# Patient Record
Sex: FEMALE | Race: WHITE | NOT HISPANIC OR LATINO | Employment: FULL TIME | URBAN - METROPOLITAN AREA
[De-identification: names, ages, dates, MRNs, and addresses within clinical notes are randomized per-mention and may not be internally consistent; named-entity substitution may affect disease eponyms.]

---

## 2021-07-15 ENCOUNTER — HOSPITAL ENCOUNTER (OUTPATIENT)
Dept: GENERAL RADIOLOGY | Facility: HOSPITAL | Age: 61
Discharge: HOME | End: 2021-07-15

## 2021-07-15 ENCOUNTER — LAB (OUTPATIENT)
Dept: LAB | Facility: HOSPITAL | Age: 61
End: 2021-07-15

## 2021-07-15 ENCOUNTER — HOSPITAL ENCOUNTER (OUTPATIENT)
Dept: CARDIOLOGY | Facility: HOSPITAL | Age: 61
Discharge: HOME | End: 2021-07-15

## 2021-07-15 LAB
ABO GROUP BLD: NORMAL
APTT PPP: 26.8 SECONDS (ref 24–31)
BLD GP AB SCN SERPL QL: NEGATIVE
INR PPP: 0.95 (ref 0.93–1.1)
MRSA DNA SPEC QL NAA+PROBE: ABNORMAL
PROTHROMBIN TIME: 10.6 SECONDS (ref 9.6–11.7)
QT INTERVAL: 387 MS
RH BLD: POSITIVE
T&S EXPIRATION DATE: NORMAL

## 2021-07-15 PROCEDURE — 86900 BLOOD TYPING SEROLOGIC ABO: CPT

## 2021-07-15 PROCEDURE — 86850 RBC ANTIBODY SCREEN: CPT

## 2021-07-15 PROCEDURE — 93010 ELECTROCARDIOGRAM REPORT: CPT | Performed by: INTERNAL MEDICINE

## 2021-07-15 PROCEDURE — 86901 BLOOD TYPING SEROLOGIC RH(D): CPT

## 2021-07-15 PROCEDURE — 85027 COMPLETE CBC AUTOMATED: CPT

## 2021-07-15 PROCEDURE — 81003 URINALYSIS AUTO W/O SCOPE: CPT

## 2021-07-15 PROCEDURE — 93005 ELECTROCARDIOGRAM TRACING: CPT | Performed by: ORTHOPAEDIC SURGERY

## 2021-07-15 PROCEDURE — 71046 X-RAY EXAM CHEST 2 VIEWS: CPT

## 2021-07-15 PROCEDURE — 80048 BASIC METABOLIC PNL TOTAL CA: CPT

## 2021-07-15 PROCEDURE — 87641 MR-STAPH DNA AMP PROBE: CPT

## 2021-07-15 PROCEDURE — 85610 PROTHROMBIN TIME: CPT

## 2021-07-15 PROCEDURE — 85730 THROMBOPLASTIN TIME PARTIAL: CPT

## 2021-07-16 LAB
ANION GAP SERPL CALCULATED.3IONS-SCNC: 10.6 MMOL/L (ref 5–15)
BILIRUB UR QL STRIP: NEGATIVE
BUN SERPL-MCNC: 16 MG/DL (ref 8–23)
BUN/CREAT SERPL: 26.7 (ref 7–25)
CALCIUM SPEC-SCNC: 9.6 MG/DL (ref 8.6–10.5)
CHLORIDE SERPL-SCNC: 104 MMOL/L (ref 98–107)
CLARITY UR: CLEAR
CO2 SERPL-SCNC: 25.4 MMOL/L (ref 22–29)
COLOR UR: YELLOW
CREAT SERPL-MCNC: 0.6 MG/DL (ref 0.57–1)
DEPRECATED RDW RBC AUTO: 46.8 FL (ref 37–54)
ERYTHROCYTE [DISTWIDTH] IN BLOOD BY AUTOMATED COUNT: 14.3 % (ref 12.3–15.4)
GFR SERPL CREATININE-BSD FRML MDRD: 102 ML/MIN/1.73
GLUCOSE SERPL-MCNC: 92 MG/DL (ref 65–99)
GLUCOSE UR STRIP-MCNC: NEGATIVE MG/DL
HCT VFR BLD AUTO: 39 % (ref 34–46.6)
HGB BLD-MCNC: 13.2 G/DL (ref 12–15.9)
HGB UR QL STRIP.AUTO: NEGATIVE
KETONES UR QL STRIP: NEGATIVE
LEUKOCYTE ESTERASE UR QL STRIP.AUTO: NEGATIVE
MCH RBC QN AUTO: 30.6 PG (ref 26.6–33)
MCHC RBC AUTO-ENTMCNC: 33.8 G/DL (ref 31.5–35.7)
MCV RBC AUTO: 90.5 FL (ref 79–97)
NITRITE UR QL STRIP: NEGATIVE
PH UR STRIP.AUTO: 5.5 [PH] (ref 5–8)
PLATELET # BLD AUTO: 404 10*3/MM3 (ref 140–450)
PMV BLD AUTO: 11 FL (ref 6–12)
POTASSIUM SERPL-SCNC: 3.7 MMOL/L (ref 3.5–5.2)
PROT UR QL STRIP: NEGATIVE
RBC # BLD AUTO: 4.31 10*6/MM3 (ref 3.77–5.28)
SODIUM SERPL-SCNC: 140 MMOL/L (ref 136–145)
SP GR UR STRIP: 1.03 (ref 1–1.03)
UROBILINOGEN UR QL STRIP: NORMAL
WBC # BLD AUTO: 9.9 10*3/MM3 (ref 3.4–10.8)

## 2021-07-22 ENCOUNTER — TELEPHONE (OUTPATIENT)
Dept: CARDIOLOGY | Facility: CLINIC | Age: 61
End: 2021-07-22

## 2021-07-22 NOTE — TELEPHONE ENCOUNTER
CARDIAC CLEARANCE  [facility]# [phone]  [phone]  LT KNEE REPLACEMENT  ABNORMAL EKG  PT SCHEDULED July 30

## 2021-08-20 ENCOUNTER — OFFICE VISIT (OUTPATIENT)
Dept: CARDIOLOGY | Facility: CLINIC | Age: 61
End: 2021-08-20

## 2021-08-20 VITALS
HEIGHT: 63 IN | OXYGEN SATURATION: 97 % | DIASTOLIC BLOOD PRESSURE: 80 MMHG | HEART RATE: 81 BPM | BODY MASS INDEX: 42.7 KG/M2 | SYSTOLIC BLOOD PRESSURE: 134 MMHG | WEIGHT: 241 LBS

## 2021-08-20 DIAGNOSIS — R94.31 ABNORMAL EKG: Primary | ICD-10-CM

## 2021-08-20 DIAGNOSIS — M17.12 OSTEOARTHRITIS OF LEFT KNEE, UNSPECIFIED OSTEOARTHRITIS TYPE: ICD-10-CM

## 2021-08-20 DIAGNOSIS — Z01.810 PREOPERATIVE CARDIOVASCULAR EXAMINATION: ICD-10-CM

## 2021-08-20 DIAGNOSIS — E78.5 DYSLIPIDEMIA: ICD-10-CM

## 2021-08-20 DIAGNOSIS — R00.2 PALPITATIONS: ICD-10-CM

## 2021-08-20 DIAGNOSIS — R60.0 BILATERAL LEG EDEMA: ICD-10-CM

## 2021-08-20 DIAGNOSIS — E66.01 MORBID OBESITY WITH BMI OF 40.0-44.9, ADULT (HCC): ICD-10-CM

## 2021-08-20 PROCEDURE — 99204 OFFICE O/P NEW MOD 45 MIN: CPT | Performed by: INTERNAL MEDICINE

## 2021-08-20 RX ORDER — INDOMETHACIN 50 MG/1
CAPSULE ORAL
COMMUNITY
Start: 2021-08-20 | End: 2021-08-20 | Stop reason: SDUPTHER

## 2021-08-20 NOTE — PROGRESS NOTES
Cardiology Consult Note    Patient Identification:  Name: Amita Arguello  Age: 61 y.o.  Sex: female  :  1960  MRN: [id number]             Requesting Physician :  Edvin Sainz MD    Reason for Consultation / Chief Complaint : Preop for left knee surgery    History of Present Illness:      This is a 61-year-old with PMH of    -Chronic leg edema  -Hypothyroidism  -Obesity with BMI over 42  Family history negative for premature CAD, no significant surgical history  Former smoker quit over 20 years ago    Presented for preoperative cardiovascular evaluation for knee surgery.  Patient has been crippled with knee pain is on crutches for last 5 to 6 months.  Is not very active due to knee pain.  Has chronic edema and has been on hydrochlorothiazide.  Patient has some dyspnea on exertion denies any chest pain.  Patient's arterial blood pressure is 134/80, heart rate 81, O2 sat of 97% on room air.  Patient's BMI is over 42.  Review of records reveal labs from 7/15/2021 revealed normal Chem-7, CBC, PT PTT.  Labs from 2018 revealed cholesterol 164 triglycerides 163 HDL 39 LDL 92.  Repeat from 2021 reveal cholesterol 191 triglycerides 182 HDL 49 .      Assessment:      Preoperative cardiovascular evaluation for left knee surgery/DJD  Abnormal EKG  Dyspnea on exertion  Leg edema  Functionally inactive due to knee pain and on crutches  Chronic leg edema  Obesity with BMI over 42  Dyslipidemia    Recommendations / Plan:        Reviewed EKG results with patient.  Patient is functionally inactive will do a Lexiscan Cardiolite to evaluate abnormal EKG and preoperative cardiovascular risk.  Reviewed BMI over 42 counseled on weight loss diet and exercise.  Patient says she has been on phentermine and has lost some weight.  Reviewed low HDL counseled on walking exercise.  We will follow-up after stress test and consider further evaluation and treatment.  Patient's arterial blood pressure is 134/80, heart  rate 81, O2 sat of 97% on room air.  Patient has chronic leg edema is obese will check echocardiogram to assess LV systolic function also to look at right side function.  Patient probably has sleep apnea.  Will follow up and consider further evaluation and treatment.           Diagnosis Plan   1. Abnormal EKG  Adult Transthoracic Echo Complete W/ Cont if Necessary Per Protocol    Stress Test With Myocardial Perfusion One Day   2. Bilateral leg edema  Adult Transthoracic Echo Complete W/ Cont if Necessary Per Protocol    Stress Test With Myocardial Perfusion One Day   3. Palpitations  Adult Transthoracic Echo Complete W/ Cont if Necessary Per Protocol    Stress Test With Myocardial Perfusion One Day   4. Morbid obesity with BMI of 40.0-44.9, adult (CMS/[facility])  Adult Transthoracic Echo Complete W/ Cont if Necessary Per Protocol    Stress Test With Myocardial Perfusion One Day   5. Preoperative cardiovascular examination  Adult Transthoracic Echo Complete W/ Cont if Necessary Per Protocol    Stress Test With Myocardial Perfusion One Day   6. Osteoarthritis of left knee, unspecified osteoarthritis type  Adult Transthoracic Echo Complete W/ Cont if Necessary Per Protocol    Stress Test With Myocardial Perfusion One Day   7. Dyslipidemia                Past Medical History:  Past Medical History:   Diagnosis Date   • Arthritis    • Disease of thyroid gland    • Rheumatoid arthritis (CMS/[facility])      Past Surgical History:  History reviewed. No pertinent surgical history.   Allergies:  No Known Allergies  Home Meds:  Current Meds:     Current Outpatient Medications:   •  Abatacept (ORENCIA IV), Infuse 1,000 mg into a venous catheter Every 30 (Thirty) Days. 15th of every month Ok to receive 7-15 dose per Ana at DR Hammer's office   INFUSION SCHEDULED FOR 7/19 ADVISED TO NOTIFY DR HAMMER, Disp: , Rfl:   •  acetaminophen (TYLENOL) 650 MG 8 hr tablet, Take 650 mg by mouth Every 8 (Eight) Hours As Needed for Mild Pain ., Disp: ,  Rfl:   •  folic acid (FOLVITE) 1 MG tablet, Take 1 mg by mouth Daily., Disp: , Rfl:   •  hydroCHLOROthiazide (HYDRODIURIL) 25 MG tablet, Take 25 mg by mouth Daily. Do not take dos, Disp: , Rfl:   •  indomethacin (INDOCIN) 50 MG capsule, Take 50 mg by mouth Daily. Stop 1 week prior to surgery, Disp: , Rfl:   •  levothyroxine (SYNTHROID, LEVOTHROID) 25 MCG tablet, Take 25 mcg by mouth Daily. Take dos, Disp: , Rfl:   •  methotrexate 2.5 MG tablet, Take 15 mg by mouth 1 (One) Time Per Week. Sat  Stop 1 week prior to surgery per Ana at DR. Arguello's office, Disp: , Rfl:   •  phentermine 30 MG capsule, Take 30 mg by mouth Every Morning., Disp: , Rfl:   •  predniSONE (DELTASONE) 5 MG tablet, Take 5 mg by mouth Daily. Take dos, Disp: , Rfl:   •  traMADol (ULTRAM) 50 MG tablet, Take 50 mg by mouth Every 6 (Six) Hours As Needed for Moderate Pain . DONT TAKE PREOP, Disp: , Rfl:   •  vitamin B-12 (CYANOCOBALAMIN) 500 MCG tablet, Take 500 mcg by mouth Daily. Stop 1 week prior to surgery, Disp: , Rfl:   •  vitamin D3 125 MCG (5000 UT) capsule capsule, Take 5,000 Units by mouth Daily. Stop 1 week prior to surgery, Disp: , Rfl:   Social History:   Social History     Tobacco Use   • Smoking status: Former Smoker     Packs/day: 1.00     Years: 20.00     Pack years: 20.00     Types: Cigarettes   • Smokeless tobacco: Never Used   Substance Use Topics   • Alcohol use: Not Currently      Family History:  Family History   Problem Relation Age of Onset   • No Known Problems Mother    • No Known Problems Father         Review of Systems : Review of Systems   Constitutional: Negative for fever and malaise/fatigue.   HENT: Negative for congestion and hearing loss.    Eyes: Negative for double vision and visual disturbance.   Cardiovascular: Positive for leg swelling and palpitations. Negative for chest pain, claudication, dyspnea on exertion and syncope.   Respiratory: Negative for cough and shortness of breath.    Endocrine: Negative for  "cold intolerance.   Skin: Negative for color change and rash.   Musculoskeletal: Positive for arthritis and joint pain.   Gastrointestinal: Positive for heartburn. Negative for abdominal pain.   Genitourinary: Negative for hematuria.   Neurological: Negative for excessive daytime sleepiness and dizziness.   Psychiatric/Behavioral: Negative for depression. The patient is not nervous/anxious.    All other systems reviewed and are negative.              Constitutional:  Heart Rate:  [81] 81  BP: (134)/(80) 134/80    Physical Exam   /80   Pulse 81   Ht 160 cm (63\")   Wt 109 kg (241 lb)   SpO2 97%   BMI 42.69 kg/m²   Physical Exam  General:  Appears in no acute distress, pleasant obese  Eyes: Sclera is anicteric,  conjunctiva is clear   HEENT:  No JVD. Thyroid not visibly enlarged. No mucosal pallor or cyanosis  Respiratory: Respirations regular and unlabored at rest.  Bilaterally good breath sounds, with good air entry in all fields. No crackles, rubs or wheezes auscultated  Cardiovascular: S1,S2 Regular rate and rhythm. No murmur, rub or gallop auscultated. No pretibial pitting edema  Gastrointestinal: Abdomen soft, flat, non tender. Bowel sounds present.   Musculoskeletal: Walking with crutches  Extremities: No digital clubbing or cyanosis  Skin: Color pink. Skin warm and dry to touch. No rashes  No xanthoma  Neuro: Alert and awake, no lateralizing deficits appreciated    Cardiographics  ECG: EKG tracing was  personally reviewed/interpreted by me from 7/15/2021 reveals sinus rhythm with rate of 76 bpm with Q waves in V1 V2 suggestive of anteroseptal MI        Imaging  Chest X-ray:   Imaging Results (Last 24 Hours)     ** No results found for the last 24 hours. **          Lab Review: I have reviewed the labs              @[email or web address]@                  Tyler Martin MD  8/20/2021, 11:51 EDT      EMR Dragon/Transcription:   Dictated utilizing Dragon dictation  "

## 2021-08-30 ENCOUNTER — TELEPHONE (OUTPATIENT)
Dept: CARDIOLOGY | Facility: CLINIC | Age: 61
End: 2021-08-30

## 2021-09-02 ENCOUNTER — OFFICE VISIT (OUTPATIENT)
Dept: CARDIOLOGY | Facility: CLINIC | Age: 61
End: 2021-09-02

## 2021-09-02 ENCOUNTER — HOSPITAL ENCOUNTER (OUTPATIENT)
Dept: CARDIOLOGY | Facility: HOSPITAL | Age: 61
Discharge: HOME | End: 2021-09-02

## 2021-09-02 ENCOUNTER — HOSPITAL ENCOUNTER (OUTPATIENT)
Dept: CARDIOLOGY | Facility: HOSPITAL | Age: 61
Discharge: HOME | End: 2021-09-02
Admitting: INTERNAL MEDICINE

## 2021-09-02 VITALS
SYSTOLIC BLOOD PRESSURE: 132 MMHG | WEIGHT: 241 LBS | DIASTOLIC BLOOD PRESSURE: 70 MMHG | HEIGHT: 63 IN | BODY MASS INDEX: 42.7 KG/M2

## 2021-09-02 VITALS
WEIGHT: 241 LBS | HEART RATE: 64 BPM | HEIGHT: 63 IN | BODY MASS INDEX: 42.7 KG/M2 | SYSTOLIC BLOOD PRESSURE: 132 MMHG | DIASTOLIC BLOOD PRESSURE: 80 MMHG

## 2021-09-02 DIAGNOSIS — M17.12 OSTEOARTHRITIS OF LEFT KNEE, UNSPECIFIED OSTEOARTHRITIS TYPE: ICD-10-CM

## 2021-09-02 DIAGNOSIS — Z01.810 PREOPERATIVE CARDIOVASCULAR EXAMINATION: ICD-10-CM

## 2021-09-02 DIAGNOSIS — R60.0 BILATERAL LEG EDEMA: ICD-10-CM

## 2021-09-02 DIAGNOSIS — R00.2 PALPITATIONS: ICD-10-CM

## 2021-09-02 DIAGNOSIS — R94.31 ABNORMAL EKG: ICD-10-CM

## 2021-09-02 DIAGNOSIS — E66.01 MORBID OBESITY WITH BMI OF 40.0-44.9, ADULT (HCC): ICD-10-CM

## 2021-09-02 DIAGNOSIS — E78.5 DYSLIPIDEMIA: ICD-10-CM

## 2021-09-02 DIAGNOSIS — I47.1 SUSTAINED SVT (HCC): Primary | ICD-10-CM

## 2021-09-02 DIAGNOSIS — Z01.810 PREOPERATIVE CARDIOVASCULAR EXAMINATION: Primary | ICD-10-CM

## 2021-09-02 LAB
BH CV ECHO MEAS - ACS: 2.5 CM
BH CV ECHO MEAS - AO MAX PG (FULL): 2.5 MMHG
BH CV ECHO MEAS - AO MAX PG: 8.3 MMHG
BH CV ECHO MEAS - AO MEAN PG (FULL): 0.84 MMHG
BH CV ECHO MEAS - AO MEAN PG: 4 MMHG
BH CV ECHO MEAS - AO ROOT AREA (BSA CORRECTED): 1.8
BH CV ECHO MEAS - AO ROOT AREA: 10.6 CM^2
BH CV ECHO MEAS - AO ROOT DIAM: 3.7 CM
BH CV ECHO MEAS - AO V2 MAX: 144.3 CM/SEC
BH CV ECHO MEAS - AO V2 MEAN: 94.3 CM/SEC
BH CV ECHO MEAS - AO V2 VTI: 33.6 CM
BH CV ECHO MEAS - ASC AORTA: 3 CM
BH CV ECHO MEAS - AVA(I,A): 2.6 CM^2
BH CV ECHO MEAS - AVA(I,D): 2.6 CM^2
BH CV ECHO MEAS - AVA(V,A): 2.5 CM^2
BH CV ECHO MEAS - AVA(V,D): 2.5 CM^2
BH CV ECHO MEAS - BSA(HAYCOCK): 2.3 M^2
BH CV ECHO MEAS - BSA: 2.1 M^2
BH CV ECHO MEAS - BZI_BMI: 42.7 KILOGRAMS/M^2
BH CV ECHO MEAS - BZI_METRIC_HEIGHT: 160 CM
BH CV ECHO MEAS - BZI_METRIC_WEIGHT: 109.3 KG
BH CV ECHO MEAS - EDV(CUBED): 195.6 ML
BH CV ECHO MEAS - EDV(MOD-SP4): 104 ML
BH CV ECHO MEAS - EDV(TEICH): 166.9 ML
BH CV ECHO MEAS - EF(CUBED): 79.1 %
BH CV ECHO MEAS - EF(MOD-SP4): 58.5 %
BH CV ECHO MEAS - EF(TEICH): 70.6 %
BH CV ECHO MEAS - ESV(CUBED): 41 ML
BH CV ECHO MEAS - ESV(MOD-SP4): 43.1 ML
BH CV ECHO MEAS - ESV(TEICH): 49 ML
BH CV ECHO MEAS - FS: 40.6 %
BH CV ECHO MEAS - IVS/LVPW: 1
BH CV ECHO MEAS - IVSD: 0.95 CM
BH CV ECHO MEAS - LA DIMENSION: 3.5 CM
BH CV ECHO MEAS - LA/AO: 0.95
BH CV ECHO MEAS - LV DIASTOLIC VOL/BSA (35-75): 49.7 ML/M^2
BH CV ECHO MEAS - LV MASS(C)D: 210.7 GRAMS
BH CV ECHO MEAS - LV MASS(C)DI: 100.6 GRAMS/M^2
BH CV ECHO MEAS - LV MAX PG: 5.8 MMHG
BH CV ECHO MEAS - LV MEAN PG: 3.2 MMHG
BH CV ECHO MEAS - LV SYSTOLIC VOL/BSA (12-30): 20.6 ML/M^2
BH CV ECHO MEAS - LV V1 MAX: 120.5 CM/SEC
BH CV ECHO MEAS - LV V1 MEAN: 86.2 CM/SEC
BH CV ECHO MEAS - LV V1 VTI: 28.9 CM
BH CV ECHO MEAS - LVIDD: 5.8 CM
BH CV ECHO MEAS - LVIDS: 3.4 CM
BH CV ECHO MEAS - LVOT AREA: 3 CM^2
BH CV ECHO MEAS - LVOT DIAM: 2 CM
BH CV ECHO MEAS - LVPWD: 0.9 CM
BH CV ECHO MEAS - MV A MAX VEL: 61.6 CM/SEC
BH CV ECHO MEAS - MV DEC SLOPE: 283.9 CM/SEC^2
BH CV ECHO MEAS - MV DEC TIME: 0.26 SEC
BH CV ECHO MEAS - MV E MAX VEL: 74.5 CM/SEC
BH CV ECHO MEAS - MV E/A: 1.2
BH CV ECHO MEAS - MV MAX PG: 3.3 MMHG
BH CV ECHO MEAS - MV MEAN PG: 2 MMHG
BH CV ECHO MEAS - MV V2 MAX: 90.8 CM/SEC
BH CV ECHO MEAS - MV V2 MEAN: 69.3 CM/SEC
BH CV ECHO MEAS - MV V2 VTI: 29.8 CM
BH CV ECHO MEAS - MVA(VTI): 2.9 CM^2
BH CV ECHO MEAS - PA ACC TIME: 0.1 SEC
BH CV ECHO MEAS - PA PR(ACCEL): 35.4 MMHG
BH CV ECHO MEAS - PULM A REVS DUR: 0.09 SEC
BH CV ECHO MEAS - PULM A REVS VEL: 26.3 CM/SEC
BH CV ECHO MEAS - PULM DIAS VEL: 39.7 CM/SEC
BH CV ECHO MEAS - PULM S/D: 1.4
BH CV ECHO MEAS - PULM SYS VEL: 55.4 CM/SEC
BH CV ECHO MEAS - RAP SYSTOLE: 3 MMHG
BH CV ECHO MEAS - RV MAX PG: 2.5 MMHG
BH CV ECHO MEAS - RV MEAN PG: 1.3 MMHG
BH CV ECHO MEAS - RV V1 MAX: 78.6 CM/SEC
BH CV ECHO MEAS - RV V1 MEAN: 52.6 CM/SEC
BH CV ECHO MEAS - RV V1 VTI: 15.8 CM
BH CV ECHO MEAS - RVDD: 2.2 CM
BH CV ECHO MEAS - RVSP: 25.2 MMHG
BH CV ECHO MEAS - SI(AO): 170.2 ML/M^2
BH CV ECHO MEAS - SI(CUBED): 73.9 ML/M^2
BH CV ECHO MEAS - SI(LVOT): 41.5 ML/M^2
BH CV ECHO MEAS - SI(MOD-SP4): 29.1 ML/M^2
BH CV ECHO MEAS - SI(TEICH): 56.3 ML/M^2
BH CV ECHO MEAS - SV(AO): 356.2 ML
BH CV ECHO MEAS - SV(CUBED): 154.6 ML
BH CV ECHO MEAS - SV(LVOT): 86.8 ML
BH CV ECHO MEAS - SV(MOD-SP4): 60.8 ML
BH CV ECHO MEAS - SV(TEICH): 117.8 ML
BH CV ECHO MEAS - TR MAX VEL: 235.4 CM/SEC
BH CV REST NUCLEAR ISOTOPE DOSE: 10.9 MCI
BH CV STRESS COMMENTS STAGE 1: NORMAL
BH CV STRESS DOSE REGADENOSON STAGE 1: 0.4
BH CV STRESS DURATION MIN STAGE 1: 0
BH CV STRESS DURATION SEC STAGE 1: 10
BH CV STRESS NUCLEAR ISOTOPE DOSE: 32.7 MCI
BH CV STRESS PROTOCOL 1: NORMAL
BH CV STRESS RECOVERY BP: NORMAL MMHG
BH CV STRESS RECOVERY HR: 85 BPM
BH CV STRESS STAGE 1: 1
MAXIMAL PREDICTED HEART RATE: 160 BPM
MAXIMAL PREDICTED HEART RATE: 160 BPM
STRESS BASELINE BP: NORMAL MMHG
STRESS BASELINE HR: 60 BPM
STRESS TARGET HR: 136 BPM
STRESS TARGET HR: 136 BPM

## 2021-09-02 PROCEDURE — 78452 HT MUSCLE IMAGE SPECT MULT: CPT | Performed by: INTERNAL MEDICINE

## 2021-09-02 PROCEDURE — 0 TECHNETIUM SESTAMIBI: Performed by: INTERNAL MEDICINE

## 2021-09-02 PROCEDURE — 78452 HT MUSCLE IMAGE SPECT MULT: CPT

## 2021-09-02 PROCEDURE — 93017 CV STRESS TEST TRACING ONLY: CPT

## 2021-09-02 PROCEDURE — A9500 TC99M SESTAMIBI: HCPCS | Performed by: INTERNAL MEDICINE

## 2021-09-02 PROCEDURE — 93016 CV STRESS TEST SUPVJ ONLY: CPT | Performed by: NURSE PRACTITIONER

## 2021-09-02 PROCEDURE — 93306 TTE W/DOPPLER COMPLETE: CPT | Performed by: INTERNAL MEDICINE

## 2021-09-02 PROCEDURE — 93306 TTE W/DOPPLER COMPLETE: CPT

## 2021-09-02 PROCEDURE — 99213 OFFICE O/P EST LOW 20 MIN: CPT | Performed by: INTERNAL MEDICINE

## 2021-09-02 PROCEDURE — 25010000002 REGADENOSON 0.4 MG/5ML SOLUTION: Performed by: INTERNAL MEDICINE

## 2021-09-02 PROCEDURE — 93018 CV STRESS TEST I&R ONLY: CPT | Performed by: INTERNAL MEDICINE

## 2021-09-02 RX ADMIN — TECHNETIUM TC 99M SESTAMIBI 1 DOSE: 1 INJECTION INTRAVENOUS at 13:31

## 2021-09-02 RX ADMIN — REGADENOSON 0.4 MG: 0.08 INJECTION, SOLUTION INTRAVENOUS at 13:31

## 2021-09-02 RX ADMIN — TECHNETIUM TC 99M SESTAMIBI 1 DOSE: 1 INJECTION INTRAVENOUS at 13:03

## 2021-09-02 NOTE — PROGRESS NOTES
Subjective:     Encounter Date:09/02/2021      Patient ID: Amita Arguello is a 61 y.o. female.    Chief Complaint : Stress test follow-up.  Here for preop clearance for left knee surgery  History of Present Illness        This is a 61-year-old with PMH of    -Chronic leg edema  -Hypothyroidism  -Obesity with BMI over 42  Family history negative for premature CAD, no significant surgical history  Former smoker quit over 20 years ago    Here for stress test follow-up.  Patient presented for preoperative cardiovascular evaluation for knee surgery.  Patient has been crippled with knee pain is on crutches for last 5 to 6 months.  Is not very active due to knee pain.  Has chronic edema and has been on hydrochlorothiazide.  Patient has some dyspnea on exertion denies any chest pain.  Patient's arterial blood pressure is 132/80, heart rate 64 bpm.  Patient's BMI is over 42.  Review of records reveal labs from 7/15/2021 revealed normal Chem-7, CBC, PT PTT.  Labs from 11/2/2018 revealed cholesterol 164 triglycerides 163 HDL 39 LDL 92.  Repeat from 4/16/2021 reveal cholesterol 191 triglycerides 182 HDL 49 .      Assessment:      Preoperative cardiovascular evaluation for left knee surgery/DJD  Abnormal EKG, Lexiscan Cardiolite negative for ischemia 9/2/21  Functionally inactive due to knee pain and on crutches  Chronic leg edema  Obesity with BMI over 42  Dyslipidemia    Recommendations / Plan:        Reviewed stress test results with patient.  Patient had Lexiscan Cardiolite today which was negative for ischemia.  Reviewed images with patient.  Reviewed BMI over 42 counseled on weight loss diet and exercise.   Reviewed low HDL counseled on walking exercise.  Patient had an echocardiogram today which revealed normal LV systolic function.  Gated images on nuclear me showing EF of 44% which is falsely lowered due to diaphragmatic attenuation.            Procedures Lexiscan Cardiolite done 9/2/2021 reviewed/interpreted  by me reveals diaphragmatic attenuation, no ischemia and EF of 44%.    The following portions of the patient's history were reviewed and updated as appropriate: allergies, current medications, past family history, past medical history, past social history, past surgical history and problem list.    Assessment:         [facility]     Diagnosis Plan   1. Preoperative cardiovascular examination     2. Osteoarthritis of left knee, unspecified osteoarthritis type     3. Morbid obesity with BMI of 40.0-44.9, adult (CMS/[facility])     4. Dyslipidemia            Plan:               Past Medical History:  Past Medical History:   Diagnosis Date   • Arthritis    • Disease of thyroid gland    • Rheumatoid arthritis (CMS/[facility])    • Spinal stenosis      Past Surgical History:  History reviewed. No pertinent surgical history.   Allergies:  No Known Allergies  Home Meds:  Current Meds:     Current Outpatient Medications:   •  Abatacept (ORENCIA IV), Infuse 1,000 mg into a venous catheter Every 30 (Thirty) Days. 15th of every month Ok to receive 7-15 dose per Ana at DR Hammer's office   INFUSION SCHEDULED FOR 7/19 ADVISED TO NOTIFY DR HAMMER, Disp: , Rfl:   •  acetaminophen (TYLENOL) 650 MG 8 hr tablet, Take 650 mg by mouth Every 8 (Eight) Hours As Needed for Mild Pain ., Disp: , Rfl:   •  folic acid (FOLVITE) 1 MG tablet, Take 1 mg by mouth Daily., Disp: , Rfl:   •  hydroCHLOROthiazide (HYDRODIURIL) 25 MG tablet, Take 25 mg by mouth Daily. Do not take dos, Disp: , Rfl:   •  indomethacin (INDOCIN) 50 MG capsule, Take 50 mg by mouth Daily. Stop 1 week prior to surgery, Disp: , Rfl:   •  levothyroxine (SYNTHROID, LEVOTHROID) 25 MCG tablet, Take 25 mcg by mouth Daily. Take dos, Disp: , Rfl:   •  methotrexate 2.5 MG tablet, Take 15 mg by mouth 1 (One) Time Per Week. Sat  Stop 1 week prior to surgery per Ana Hammer's office, Disp: , Rfl:   •  phentermine 30 MG capsule, Take 30 mg by mouth Every Morning., Disp: , Rfl:   •  predniSONE  "(DELTASONE) 5 MG tablet, Take 5 mg by mouth Daily. Take dos, Disp: , Rfl:   •  traMADol (ULTRAM) 50 MG tablet, Take 50 mg by mouth Every 6 (Six) Hours As Needed for Moderate Pain . DONT TAKE PREOP, Disp: , Rfl:   •  vitamin B-12 (CYANOCOBALAMIN) 500 MCG tablet, Take 500 mcg by mouth Daily. Stop 1 week prior to surgery, Disp: , Rfl:   •  vitamin D3 125 MCG (5000 UT) capsule capsule, Take 5,000 Units by mouth Daily. Stop 1 week prior to surgery, Disp: , Rfl:   No current facility-administered medications for this visit.  Social History:   Social History     Tobacco Use   • Smoking status: Former Smoker     Packs/day: 1.00     Years: 20.00     Pack years: 20.00     Types: Cigarettes   • Smokeless tobacco: Never Used   Substance Use Topics   • Alcohol use: Not Currently      Family History:  Family History   Problem Relation Age of Onset   • No Known Problems Mother    • No Known Problems Father               Review of Systems   Cardiovascular: Positive for leg swelling. Negative for chest pain and palpitations.   Respiratory: Negative for shortness of breath.    Gastrointestinal: Positive for heartburn.   Neurological: Negative for dizziness and numbness.     All other systems are negative         Objective:     Physical Exam  /80 (BP Location: Left arm, Patient Position: Sitting, Cuff Size: Adult)   Pulse 64   Ht 160 cm (63\")   Wt 109 kg (241 lb)   BMI 42.69 kg/m²   General:  Appears in no acute distress  Eyes: Sclera is anicteric,  conjunctiva is clear   HEENT:  No JVD.  No carotid bruits  Respiratory: Respirations regular and unlabored at rest.  Clear to auscultation  Cardiovascular: S1,S2 Regular rate and rhythm. No murmur, rub or gallop auscultated.   Extremities: No digital clubbing or cyanosis, no edema  Skin: Color pink. Skin warm and dry to touch. No rashes  No xanthoma  Neuro: Alert and awake, no lateralizing deficits appreciated    Lab Reviewed:                  "

## 2021-10-05 ENCOUNTER — HOSPITAL ENCOUNTER (OUTPATIENT)
Dept: CARDIOLOGY | Facility: HOSPITAL | Age: 61
Discharge: HOME | End: 2021-10-05

## 2021-10-05 ENCOUNTER — HOSPITAL ENCOUNTER (OUTPATIENT)
Dept: GENERAL RADIOLOGY | Facility: HOSPITAL | Age: 61
Discharge: HOME | End: 2021-10-05

## 2021-10-05 ENCOUNTER — LAB (OUTPATIENT)
Dept: LAB | Facility: HOSPITAL | Age: 61
End: 2021-10-05

## 2021-10-05 LAB
ABO GROUP BLD: NORMAL
ANION GAP SERPL CALCULATED.3IONS-SCNC: 10.7 MMOL/L (ref 5–15)
APTT PPP: 24.5 SECONDS (ref 24–31)
BACTERIA UR QL AUTO: ABNORMAL /HPF
BASOPHILS # BLD AUTO: 0.09 10*3/MM3 (ref 0–0.2)
BASOPHILS NFR BLD AUTO: 0.8 % (ref 0–1.5)
BILIRUB UR QL STRIP: NEGATIVE
BLD GP AB SCN SERPL QL: NEGATIVE
BUN SERPL-MCNC: 16 MG/DL (ref 8–23)
BUN/CREAT SERPL: 27.1 (ref 7–25)
CALCIUM SPEC-SCNC: 9.8 MG/DL (ref 8.6–10.5)
CHLORIDE SERPL-SCNC: 100 MMOL/L (ref 98–107)
CLARITY UR: ABNORMAL
CO2 SERPL-SCNC: 30.3 MMOL/L (ref 22–29)
COLOR UR: YELLOW
CREAT SERPL-MCNC: 0.59 MG/DL (ref 0.57–1)
DEPRECATED RDW RBC AUTO: 47.5 FL (ref 37–54)
EOSINOPHIL # BLD AUTO: 0.08 10*3/MM3 (ref 0–0.4)
EOSINOPHIL NFR BLD AUTO: 0.7 % (ref 0.3–6.2)
ERYTHROCYTE [DISTWIDTH] IN BLOOD BY AUTOMATED COUNT: 14 % (ref 12.3–15.4)
GFR SERPL CREATININE-BSD FRML MDRD: 104 ML/MIN/1.73
GLUCOSE SERPL-MCNC: 84 MG/DL (ref 65–99)
GLUCOSE UR STRIP-MCNC: NEGATIVE MG/DL
HCT VFR BLD AUTO: 41.9 % (ref 34–46.6)
HGB BLD-MCNC: 13.8 G/DL (ref 12–15.9)
HGB UR QL STRIP.AUTO: NEGATIVE
HYALINE CASTS UR QL AUTO: ABNORMAL /LPF
IMM GRANULOCYTES # BLD AUTO: 0.05 10*3/MM3 (ref 0–0.05)
IMM GRANULOCYTES NFR BLD AUTO: 0.4 % (ref 0–0.5)
INR PPP: 0.95 (ref 0.93–1.1)
KETONES UR QL STRIP: NEGATIVE
LEUKOCYTE ESTERASE UR QL STRIP.AUTO: ABNORMAL
LYMPHOCYTES # BLD AUTO: 2.39 10*3/MM3 (ref 0.7–3.1)
LYMPHOCYTES NFR BLD AUTO: 20.8 % (ref 19.6–45.3)
MCH RBC QN AUTO: 30.9 PG (ref 26.6–33)
MCHC RBC AUTO-ENTMCNC: 32.9 G/DL (ref 31.5–35.7)
MCV RBC AUTO: 93.7 FL (ref 79–97)
MONOCYTES # BLD AUTO: 0.89 10*3/MM3 (ref 0.1–0.9)
MONOCYTES NFR BLD AUTO: 7.7 % (ref 5–12)
MRSA DNA SPEC QL NAA+PROBE: ABNORMAL
NEUTROPHILS NFR BLD AUTO: 69.6 % (ref 42.7–76)
NEUTROPHILS NFR BLD AUTO: 8 10*3/MM3 (ref 1.7–7)
NITRITE UR QL STRIP: NEGATIVE
NRBC BLD AUTO-RTO: 0 /100 WBC (ref 0–0.2)
PH UR STRIP.AUTO: 6.5 [PH] (ref 5–8)
PLATELET # BLD AUTO: 395 10*3/MM3 (ref 140–450)
PMV BLD AUTO: 10 FL (ref 6–12)
POTASSIUM SERPL-SCNC: 3.6 MMOL/L (ref 3.5–5.2)
PROT UR QL STRIP: ABNORMAL
PROTHROMBIN TIME: 10.6 SECONDS (ref 9.6–11.7)
RBC # BLD AUTO: 4.47 10*6/MM3 (ref 3.77–5.28)
RBC # UR: ABNORMAL /HPF
REF LAB TEST METHOD: ABNORMAL
RH BLD: POSITIVE
SODIUM SERPL-SCNC: 141 MMOL/L (ref 136–145)
SP GR UR STRIP: 1.02 (ref 1–1.03)
SQUAMOUS #/AREA URNS HPF: ABNORMAL /HPF
T&S EXPIRATION DATE: NORMAL
UROBILINOGEN UR QL STRIP: ABNORMAL
WBC # BLD AUTO: 11.5 10*3/MM3 (ref 3.4–10.8)
WBC UR QL AUTO: ABNORMAL /HPF

## 2021-10-05 PROCEDURE — 80048 BASIC METABOLIC PNL TOTAL CA: CPT

## 2021-10-05 PROCEDURE — 81001 URINALYSIS AUTO W/SCOPE: CPT

## 2021-10-05 PROCEDURE — 86850 RBC ANTIBODY SCREEN: CPT

## 2021-10-05 PROCEDURE — 85025 COMPLETE CBC W/AUTO DIFF WBC: CPT

## 2021-10-05 PROCEDURE — 93010 ELECTROCARDIOGRAM REPORT: CPT | Performed by: INTERNAL MEDICINE

## 2021-10-05 PROCEDURE — 85730 THROMBOPLASTIN TIME PARTIAL: CPT

## 2021-10-05 PROCEDURE — 85610 PROTHROMBIN TIME: CPT

## 2021-10-05 PROCEDURE — 86900 BLOOD TYPING SEROLOGIC ABO: CPT

## 2021-10-05 PROCEDURE — 87641 MR-STAPH DNA AMP PROBE: CPT

## 2021-10-05 PROCEDURE — 86901 BLOOD TYPING SEROLOGIC RH(D): CPT

## 2021-10-05 PROCEDURE — 71046 X-RAY EXAM CHEST 2 VIEWS: CPT

## 2021-10-05 PROCEDURE — 93005 ELECTROCARDIOGRAM TRACING: CPT | Performed by: ORTHOPAEDIC SURGERY

## 2021-10-08 LAB — QT INTERVAL: 423 MS

## 2021-10-14 ENCOUNTER — ANESTHESIA EVENT (OUTPATIENT)
Dept: PERIOP | Facility: HOSPITAL | Age: 61
End: 2021-10-14

## 2021-10-15 ENCOUNTER — HOSPITAL ENCOUNTER (OUTPATIENT)
Facility: HOSPITAL | Age: 61
Discharge: NURSING, REHAB OR LONG-TERM CARE | End: 2021-10-19
Attending: ORTHOPAEDIC SURGERY | Admitting: ORTHOPAEDIC SURGERY

## 2021-10-15 ENCOUNTER — APPOINTMENT (OUTPATIENT)
Dept: GENERAL RADIOLOGY | Facility: HOSPITAL | Age: 61
End: 2021-10-15

## 2021-10-15 ENCOUNTER — ANESTHESIA (OUTPATIENT)
Dept: PERIOP | Facility: HOSPITAL | Age: 61
End: 2021-10-15

## 2021-10-15 PROBLEM — Z96.659 TOTAL KNEE REPLACEMENT STATUS: Status: ACTIVE | Noted: 2021-10-15

## 2021-10-15 LAB
ANION GAP SERPL CALCULATED.3IONS-SCNC: 12 MMOL/L (ref 5–15)
BUN SERPL-MCNC: 14 MG/DL (ref 8–23)
BUN/CREAT SERPL: 18.7 (ref 7–25)
CALCIUM SPEC-SCNC: 8.9 MG/DL (ref 8.6–10.5)
CHLORIDE SERPL-SCNC: 100 MMOL/L (ref 98–107)
CO2 SERPL-SCNC: 23 MMOL/L (ref 22–29)
CREAT SERPL-MCNC: 0.75 MG/DL (ref 0.57–1)
DEPRECATED RDW RBC AUTO: 48.1 FL (ref 37–54)
ERYTHROCYTE [DISTWIDTH] IN BLOOD BY AUTOMATED COUNT: 15 % (ref 12.3–15.4)
GFR SERPL CREATININE-BSD FRML MDRD: 79 ML/MIN/1.73
GLUCOSE SERPL-MCNC: 127 MG/DL (ref 65–99)
HCT VFR BLD AUTO: 37.9 % (ref 34–46.6)
HGB BLD-MCNC: 13 G/DL (ref 12–15.9)
HOLD SPECIMEN: NORMAL
MCH RBC QN AUTO: 31.5 PG (ref 26.6–33)
MCHC RBC AUTO-ENTMCNC: 34.4 G/DL (ref 31.5–35.7)
MCV RBC AUTO: 91.6 FL (ref 79–97)
PLATELET # BLD AUTO: 364 10*3/MM3 (ref 140–450)
PMV BLD AUTO: 7.4 FL (ref 6–12)
POTASSIUM SERPL-SCNC: 3.9 MMOL/L (ref 3.5–5.2)
RBC # BLD AUTO: 4.14 10*6/MM3 (ref 3.77–5.28)
SARS-COV-2 ORF1AB RESP QL NAA+PROBE: NOT DETECTED
SODIUM SERPL-SCNC: 135 MMOL/L (ref 136–145)
WBC # BLD AUTO: 22.2 10*3/MM3 (ref 3.4–10.8)

## 2021-10-15 PROCEDURE — 80048 BASIC METABOLIC PNL TOTAL CA: CPT | Performed by: ORTHOPAEDIC SURGERY

## 2021-10-15 PROCEDURE — 63710000001 PREDNISONE PER 5 MG: Performed by: ORTHOPAEDIC SURGERY

## 2021-10-15 PROCEDURE — 73560 X-RAY EXAM OF KNEE 1 OR 2: CPT

## 2021-10-15 PROCEDURE — C1713 ANCHOR/SCREW BN/BN,TIS/BN: HCPCS | Performed by: ORTHOPAEDIC SURGERY

## 2021-10-15 PROCEDURE — 25010000002 DEXAMETHASONE PER 1 MG: Performed by: ANESTHESIOLOGIST ASSISTANT

## 2021-10-15 PROCEDURE — 25010000002 FENTANYL CITRATE (PF) 100 MCG/2ML SOLUTION: Performed by: ANESTHESIOLOGIST ASSISTANT

## 2021-10-15 PROCEDURE — 25010000002 PROPOFOL 10 MG/ML EMULSION: Performed by: ANESTHESIOLOGIST ASSISTANT

## 2021-10-15 PROCEDURE — 25010000002 NEOSTIGMINE 5 MG/5ML SOLUTION: Performed by: ANESTHESIOLOGY

## 2021-10-15 PROCEDURE — G0378 HOSPITAL OBSERVATION PER HR: HCPCS

## 2021-10-15 PROCEDURE — C1776 JOINT DEVICE (IMPLANTABLE): HCPCS | Performed by: ORTHOPAEDIC SURGERY

## 2021-10-15 PROCEDURE — 25010000002 MIDAZOLAM PER 1 MG: Performed by: ANESTHESIOLOGIST ASSISTANT

## 2021-10-15 PROCEDURE — 25010000002 CEFAZOLIN PER 500 MG: Performed by: ORTHOPAEDIC SURGERY

## 2021-10-15 PROCEDURE — C9803 HOPD COVID-19 SPEC COLLECT: HCPCS

## 2021-10-15 PROCEDURE — C1889 IMPLANT/INSERT DEVICE, NOC: HCPCS | Performed by: ORTHOPAEDIC SURGERY

## 2021-10-15 PROCEDURE — U0004 COV-19 TEST NON-CDC HGH THRU: HCPCS | Performed by: ORTHOPAEDIC SURGERY

## 2021-10-15 PROCEDURE — 85027 COMPLETE CBC AUTOMATED: CPT | Performed by: ORTHOPAEDIC SURGERY

## 2021-10-15 PROCEDURE — 25010000002 HYDROMORPHONE PER 4 MG: Performed by: ANESTHESIOLOGIST ASSISTANT

## 2021-10-15 DEVICE — JOURNEY II BCS FEMORAL OXINIUM                                    LEFT SIZE 5
Type: IMPLANTABLE DEVICE | Site: KNEE | Status: FUNCTIONAL
Brand: JOURNEY

## 2021-10-15 DEVICE — JOURNEY II BCS XLPE ARTICULAR                                    INSERT SZ 3-4 LEFT 11MM
Type: IMPLANTABLE DEVICE | Site: KNEE | Status: FUNCTIONAL
Brand: JOURNEY

## 2021-10-15 DEVICE — IMPLANTABLE DEVICE: Type: IMPLANTABLE DEVICE | Site: KNEE | Status: FUNCTIONAL

## 2021-10-15 DEVICE — JOURNEY 7.5 ROUND RESURF PAT 32MM STANDARD
Type: IMPLANTABLE DEVICE | Site: KNEE | Status: FUNCTIONAL
Brand: JOURNEY

## 2021-10-15 DEVICE — CMT BONE PALACOS R HI/VISC 1X40: Type: IMPLANTABLE DEVICE | Site: KNEE | Status: FUNCTIONAL

## 2021-10-15 DEVICE — DEV WND/CLS CONTRL TISS STRATAFIX SYMM PDS PLS CTX 60CM VIL: Type: IMPLANTABLE DEVICE | Site: KNEE | Status: FUNCTIONAL

## 2021-10-15 DEVICE — JOURNEY TIBIAL BASEPLATE NONPOROUS                                    LEFT SIZE 4
Type: IMPLANTABLE DEVICE | Site: KNEE | Status: FUNCTIONAL
Brand: JOURNEY

## 2021-10-15 DEVICE — DEV CONTRL TISS STRATAFIX SPIRAL PDS PLS CT1 2-0 1/2 30CM: Type: IMPLANTABLE DEVICE | Site: KNEE | Status: FUNCTIONAL

## 2021-10-15 RX ORDER — SODIUM CHLORIDE 9 MG/ML
100 INJECTION, SOLUTION INTRAVENOUS CONTINUOUS
Status: DISCONTINUED | OUTPATIENT
Start: 2021-10-15 | End: 2021-10-19 | Stop reason: HOSPADM

## 2021-10-15 RX ORDER — ONDANSETRON 2 MG/ML
4 INJECTION INTRAMUSCULAR; INTRAVENOUS ONCE AS NEEDED
Status: DISCONTINUED | OUTPATIENT
Start: 2021-10-15 | End: 2021-10-15 | Stop reason: HOSPADM

## 2021-10-15 RX ORDER — FENTANYL CITRATE 50 UG/ML
INJECTION, SOLUTION INTRAMUSCULAR; INTRAVENOUS AS NEEDED
Status: DISCONTINUED | OUTPATIENT
Start: 2021-10-15 | End: 2021-10-15 | Stop reason: SURG

## 2021-10-15 RX ORDER — ONDANSETRON 2 MG/ML
4 INJECTION INTRAMUSCULAR; INTRAVENOUS EVERY 6 HOURS PRN
Status: DISCONTINUED | OUTPATIENT
Start: 2021-10-15 | End: 2021-10-19 | Stop reason: HOSPADM

## 2021-10-15 RX ORDER — TRANEXAMIC ACID 100 MG/ML
INJECTION, SOLUTION INTRAVENOUS AS NEEDED
Status: DISCONTINUED | OUTPATIENT
Start: 2021-10-15 | End: 2021-10-15 | Stop reason: SURG

## 2021-10-15 RX ORDER — PROPOFOL 10 MG/ML
VIAL (ML) INTRAVENOUS AS NEEDED
Status: DISCONTINUED | OUTPATIENT
Start: 2021-10-15 | End: 2021-10-15 | Stop reason: SURG

## 2021-10-15 RX ORDER — PREDNISONE 1 MG/1
5 TABLET ORAL DAILY
Status: DISCONTINUED | OUTPATIENT
Start: 2021-10-15 | End: 2021-10-19 | Stop reason: HOSPADM

## 2021-10-15 RX ORDER — ONDANSETRON 4 MG/1
4 TABLET, FILM COATED ORAL EVERY 6 HOURS PRN
Status: DISCONTINUED | OUTPATIENT
Start: 2021-10-15 | End: 2021-10-19 | Stop reason: HOSPADM

## 2021-10-15 RX ORDER — DEXAMETHASONE SODIUM PHOSPHATE 4 MG/ML
INJECTION, SOLUTION INTRA-ARTICULAR; INTRALESIONAL; INTRAMUSCULAR; INTRAVENOUS; SOFT TISSUE AS NEEDED
Status: DISCONTINUED | OUTPATIENT
Start: 2021-10-15 | End: 2021-10-15 | Stop reason: SURG

## 2021-10-15 RX ORDER — TRANEXAMIC ACID 10 MG/ML
1000 INJECTION, SOLUTION INTRAVENOUS ONCE
Status: DISCONTINUED | OUTPATIENT
Start: 2021-10-15 | End: 2021-10-15 | Stop reason: HOSPADM

## 2021-10-15 RX ORDER — BUPIVACAINE HYDROCHLORIDE 2.5 MG/ML
INJECTION, SOLUTION INFILTRATION; PERINEURAL AS NEEDED
Status: DISCONTINUED | OUTPATIENT
Start: 2021-10-15 | End: 2021-10-15 | Stop reason: HOSPADM

## 2021-10-15 RX ORDER — DOCUSATE SODIUM 100 MG/1
100 CAPSULE, LIQUID FILLED ORAL 2 TIMES DAILY PRN
Status: DISCONTINUED | OUTPATIENT
Start: 2021-10-15 | End: 2021-10-19 | Stop reason: HOSPADM

## 2021-10-15 RX ORDER — ROCURONIUM BROMIDE 10 MG/ML
INJECTION, SOLUTION INTRAVENOUS AS NEEDED
Status: DISCONTINUED | OUTPATIENT
Start: 2021-10-15 | End: 2021-10-15 | Stop reason: SURG

## 2021-10-15 RX ORDER — SODIUM CHLORIDE 0.9 % (FLUSH) 0.9 %
10 SYRINGE (ML) INJECTION EVERY 12 HOURS SCHEDULED
Status: DISCONTINUED | OUTPATIENT
Start: 2021-10-15 | End: 2021-10-15 | Stop reason: HOSPADM

## 2021-10-15 RX ORDER — SODIUM CHLORIDE 0.9 % (FLUSH) 0.9 %
10 SYRINGE (ML) INJECTION AS NEEDED
Status: DISCONTINUED | OUTPATIENT
Start: 2021-10-15 | End: 2021-10-15 | Stop reason: HOSPADM

## 2021-10-15 RX ORDER — NEOSTIGMINE METHYLSULFATE 5 MG/5 ML
SYRINGE (ML) INTRAVENOUS AS NEEDED
Status: DISCONTINUED | OUTPATIENT
Start: 2021-10-15 | End: 2021-10-15 | Stop reason: SURG

## 2021-10-15 RX ORDER — MELOXICAM 15 MG/1
15 TABLET ORAL DAILY
Status: DISCONTINUED | OUTPATIENT
Start: 2021-10-15 | End: 2021-10-19 | Stop reason: HOSPADM

## 2021-10-15 RX ORDER — FENTANYL CITRATE 50 UG/ML
50 INJECTION, SOLUTION INTRAMUSCULAR; INTRAVENOUS
Status: DISCONTINUED | OUTPATIENT
Start: 2021-10-15 | End: 2021-10-15 | Stop reason: HOSPADM

## 2021-10-15 RX ORDER — LEVOTHYROXINE SODIUM 0.03 MG/1
25 TABLET ORAL DAILY
Status: DISCONTINUED | OUTPATIENT
Start: 2021-10-15 | End: 2021-10-19 | Stop reason: HOSPADM

## 2021-10-15 RX ORDER — ACETAMINOPHEN 500 MG
1000 TABLET ORAL ONCE
Status: COMPLETED | OUTPATIENT
Start: 2021-10-15 | End: 2021-10-15

## 2021-10-15 RX ORDER — OXYCODONE HYDROCHLORIDE 5 MG/1
10 TABLET ORAL EVERY 4 HOURS PRN
Status: DISCONTINUED | OUTPATIENT
Start: 2021-10-15 | End: 2021-10-19 | Stop reason: HOSPADM

## 2021-10-15 RX ORDER — SODIUM CHLORIDE, SODIUM LACTATE, POTASSIUM CHLORIDE, CALCIUM CHLORIDE 600; 310; 30; 20 MG/100ML; MG/100ML; MG/100ML; MG/100ML
INJECTION, SOLUTION INTRAVENOUS CONTINUOUS PRN
Status: DISCONTINUED | OUTPATIENT
Start: 2021-10-15 | End: 2021-10-15 | Stop reason: SURG

## 2021-10-15 RX ORDER — OXYCODONE HYDROCHLORIDE 5 MG/1
5 TABLET ORAL EVERY 4 HOURS PRN
Status: DISCONTINUED | OUTPATIENT
Start: 2021-10-15 | End: 2021-10-19 | Stop reason: HOSPADM

## 2021-10-15 RX ORDER — NALOXONE HCL 0.4 MG/ML
0.1 VIAL (ML) INJECTION
Status: DISCONTINUED | OUTPATIENT
Start: 2021-10-15 | End: 2021-10-19 | Stop reason: HOSPADM

## 2021-10-15 RX ORDER — CELECOXIB 100 MG/1
100 CAPSULE ORAL ONCE
Status: COMPLETED | OUTPATIENT
Start: 2021-10-15 | End: 2021-10-15

## 2021-10-15 RX ORDER — OXYCODONE HCL 10 MG/1
10 TABLET, FILM COATED, EXTENDED RELEASE ORAL ONCE
Status: COMPLETED | OUTPATIENT
Start: 2021-10-15 | End: 2021-10-15

## 2021-10-15 RX ORDER — MIDAZOLAM HYDROCHLORIDE 1 MG/ML
INJECTION INTRAMUSCULAR; INTRAVENOUS AS NEEDED
Status: DISCONTINUED | OUTPATIENT
Start: 2021-10-15 | End: 2021-10-15 | Stop reason: SURG

## 2021-10-15 RX ORDER — GABAPENTIN 300 MG/1
300 CAPSULE ORAL ONCE
Status: COMPLETED | OUTPATIENT
Start: 2021-10-15 | End: 2021-10-15

## 2021-10-15 RX ORDER — FLUMAZENIL 0.1 MG/ML
0.2 INJECTION INTRAVENOUS AS NEEDED
Status: DISCONTINUED | OUTPATIENT
Start: 2021-10-15 | End: 2021-10-15 | Stop reason: HOSPADM

## 2021-10-15 RX ORDER — IPRATROPIUM BROMIDE AND ALBUTEROL SULFATE 2.5; .5 MG/3ML; MG/3ML
3 SOLUTION RESPIRATORY (INHALATION) ONCE AS NEEDED
Status: DISCONTINUED | OUTPATIENT
Start: 2021-10-15 | End: 2021-10-15 | Stop reason: HOSPADM

## 2021-10-15 RX ORDER — HYDROCHLOROTHIAZIDE 25 MG/1
25 TABLET ORAL DAILY
Status: DISCONTINUED | OUTPATIENT
Start: 2021-10-15 | End: 2021-10-19 | Stop reason: HOSPADM

## 2021-10-15 RX ORDER — HYDROMORPHONE HCL 110MG/55ML
1 PATIENT CONTROLLED ANALGESIA SYRINGE INTRAVENOUS EVERY 4 HOURS PRN
Status: DISCONTINUED | OUTPATIENT
Start: 2021-10-15 | End: 2021-10-19 | Stop reason: HOSPADM

## 2021-10-15 RX ORDER — HYDROMORPHONE HCL 110MG/55ML
0.25 PATIENT CONTROLLED ANALGESIA SYRINGE INTRAVENOUS
Status: DISCONTINUED | OUTPATIENT
Start: 2021-10-15 | End: 2021-10-15 | Stop reason: HOSPADM

## 2021-10-15 RX ORDER — ASPIRIN 325 MG
325 TABLET, DELAYED RELEASE (ENTERIC COATED) ORAL DAILY
Status: DISCONTINUED | OUTPATIENT
Start: 2021-10-15 | End: 2021-10-19 | Stop reason: HOSPADM

## 2021-10-15 RX ORDER — SODIUM CHLORIDE, SODIUM LACTATE, POTASSIUM CHLORIDE, CALCIUM CHLORIDE 600; 310; 30; 20 MG/100ML; MG/100ML; MG/100ML; MG/100ML
9 INJECTION, SOLUTION INTRAVENOUS CONTINUOUS PRN
Status: DISCONTINUED | OUTPATIENT
Start: 2021-10-15 | End: 2021-10-15 | Stop reason: HOSPADM

## 2021-10-15 RX ORDER — NALOXONE HCL 0.4 MG/ML
0.4 VIAL (ML) INJECTION AS NEEDED
Status: DISCONTINUED | OUTPATIENT
Start: 2021-10-15 | End: 2021-10-15 | Stop reason: HOSPADM

## 2021-10-15 RX ADMIN — PROPOFOL 200 MG: 10 INJECTION, EMULSION INTRAVENOUS at 15:18

## 2021-10-15 RX ADMIN — FENTANYL CITRATE 50 MCG: 50 INJECTION, SOLUTION INTRAMUSCULAR; INTRAVENOUS at 15:50

## 2021-10-15 RX ADMIN — FENTANYL CITRATE 50 MCG: 50 INJECTION, SOLUTION INTRAMUSCULAR; INTRAVENOUS at 15:28

## 2021-10-15 RX ADMIN — Medication 4 MG: at 16:26

## 2021-10-15 RX ADMIN — PREDNISONE 5 MG: 5 TABLET ORAL at 19:52

## 2021-10-15 RX ADMIN — DEXAMETHASONE SODIUM PHOSPHATE 8 MG: 4 INJECTION, SOLUTION INTRAMUSCULAR; INTRAVENOUS at 15:28

## 2021-10-15 RX ADMIN — CEFAZOLIN SODIUM 2 G: 10 INJECTION, POWDER, FOR SOLUTION INTRAVENOUS at 19:53

## 2021-10-15 RX ADMIN — LEVOTHYROXINE SODIUM 25 MCG: 0.03 TABLET ORAL at 19:52

## 2021-10-15 RX ADMIN — PROPOFOL 170 MCG/KG/MIN: 10 INJECTION, EMULSION INTRAVENOUS at 15:21

## 2021-10-15 RX ADMIN — SODIUM CHLORIDE, SODIUM LACTATE, POTASSIUM CHLORIDE, AND CALCIUM CHLORIDE: .6; .31; .03; .02 INJECTION, SOLUTION INTRAVENOUS at 15:11

## 2021-10-15 RX ADMIN — PROPOFOL 50 MG: 10 INJECTION, EMULSION INTRAVENOUS at 16:25

## 2021-10-15 RX ADMIN — OXYCODONE HYDROCHLORIDE 10 MG: 10 TABLET, FILM COATED, EXTENDED RELEASE ORAL at 14:23

## 2021-10-15 RX ADMIN — SODIUM CHLORIDE 100 ML/HR: 9 INJECTION, SOLUTION INTRAVENOUS at 19:53

## 2021-10-15 RX ADMIN — ASPIRIN 325 MG: 325 TABLET, COATED ORAL at 19:52

## 2021-10-15 RX ADMIN — GLYCOPYRROLATE 0.8 MCG: 0.2 INJECTION, SOLUTION INTRAMUSCULAR; INTRAVITREAL at 16:26

## 2021-10-15 RX ADMIN — GABAPENTIN 300 MG: 300 CAPSULE ORAL at 14:23

## 2021-10-15 RX ADMIN — TRANEXAMIC ACID 1000 MG: 100 INJECTION, SOLUTION INTRAVENOUS at 16:12

## 2021-10-15 RX ADMIN — OXYCODONE 10 MG: 5 TABLET ORAL at 17:32

## 2021-10-15 RX ADMIN — CELECOXIB 100 MG: 100 CAPSULE ORAL at 14:23

## 2021-10-15 RX ADMIN — ROCURONIUM BROMIDE 40 MG: 10 INJECTION INTRAVENOUS at 15:18

## 2021-10-15 RX ADMIN — HYDROCHLOROTHIAZIDE 25 MG: 25 TABLET ORAL at 19:52

## 2021-10-15 RX ADMIN — PROPOFOL 50 MG: 10 INJECTION, EMULSION INTRAVENOUS at 16:29

## 2021-10-15 RX ADMIN — TRANEXAMIC ACID 1000 MG: 100 INJECTION, SOLUTION INTRAVENOUS at 15:25

## 2021-10-15 RX ADMIN — CEFAZOLIN SODIUM 2 G: 1 INJECTION, POWDER, FOR SOLUTION INTRAMUSCULAR; INTRAVENOUS at 15:22

## 2021-10-15 RX ADMIN — ACETAMINOPHEN 1000 MG: 500 TABLET, FILM COATED ORAL at 14:23

## 2021-10-15 RX ADMIN — MIDAZOLAM 2 MG: 1 INJECTION INTRAMUSCULAR; INTRAVENOUS at 15:15

## 2021-10-15 RX ADMIN — FENTANYL CITRATE 100 MCG: 50 INJECTION, SOLUTION INTRAMUSCULAR; INTRAVENOUS at 15:18

## 2021-10-15 RX ADMIN — FENTANYL CITRATE 50 MCG: 50 INJECTION, SOLUTION INTRAMUSCULAR; INTRAVENOUS at 15:46

## 2021-10-15 RX ADMIN — HYDROMORPHONE HYDROCHLORIDE 0.5 MG: 2 INJECTION INTRAMUSCULAR; INTRAVENOUS; SUBCUTANEOUS at 17:19

## 2021-10-15 RX ADMIN — HYDROMORPHONE HYDROCHLORIDE 0.5 MG: 2 INJECTION INTRAMUSCULAR; INTRAVENOUS; SUBCUTANEOUS at 17:05

## 2021-10-15 RX ADMIN — MELOXICAM 15 MG: 15 TABLET ORAL at 19:52

## 2021-10-15 NOTE — ANESTHESIA POSTPROCEDURE EVALUATION
Patient: Amita Arguello    Procedure Summary     Date: 10/15/21 Room [facility] OR 06 [facility] MA[facility]    Anesthesia Start: 1511 Anesthesia Stop: 1643    Procedure: TOTAL KNEE ARTHROPLASTY (Left Knee) Diagnosis:       Osteoarthritis of left knee      (Osteoarthritis of left knee [M17.12])    Surgeons: Pacheco Arguello II, MD Provider: Taylor Cardoso MD    Anesthesia Type: general with block ASA Status: 3          Anesthesia Type: general with block    Vitals  Vitals Value Taken Time   /52 10/15/21 1745   Temp 97.3 °F (36.3 °C) 10/15/21 1744   Pulse 48 10/15/21 1749   Resp 12 10/15/21 1744   SpO2 100 % 10/15/21 1749   Vitals shown include unvalidated device data.        Post Anesthesia Care and Evaluation    Patient location during evaluation: PACU  Patient participation: complete - patient participated  Level of consciousness: awake  Pain management: adequate  Airway patency: patent  Anesthetic complications: No anesthetic complications  PONV Status: controlled  Cardiovascular status: acceptable  Respiratory status: acceptable  Hydration status: acceptable  Post Neuraxial Block status: Motor and sensory function returned to baseline

## 2021-10-15 NOTE — OP NOTE
[facility] Total Knee Replacement Operative Note  Dr. ELAINA Luna” Saundra SALGUERO  [phone]    PATIENT NAME: Amita Arguello  MRN: [id number]  : 1960 AGE: 61 y.o. GENDER: female  DATE OF OPERATION: 10/15/2021  PREOPERATIVE DIAGNOSIS: End Stage Arthritis  POSTOPERATIVE DIAGNOSIS: Same  OPERATION PERFORMED: Left Total Knee Arthroplasty  SURGEON: Pacheco Arguello MD  Circulator: Cammy Cobb RN; Gloria Davis RN  Scrub Person: Mercedes Arzola  Assistant: Lisbeth Lozoya  ANESTHESIA: General  ASSISTANT: Lisbeth Saba. This case would not have been possible without another set of skilled surgical hands for retraction, use of instrumentation, and general assistance.  This assistance was vital to the success of the case.   ESTIMATED BLOOD LOSS: 50cc  SPONGE AND NEEDLE COUNT: Correct  INDICATIONS:   A discussion of operative versus nonoperative treatment was had with the patient and they failed conservative management. They elected to undergo total knee arthroplasty. The risks of surgery were discussed and included the risk of anesthesia, infection, damage to neurovascular structures, implant loosening/failure, fracture, hardware prominence, continued pain, early failure, the need for further procedures, medical complications, and others. No guarantees were made. The patient wished to proceed with surgery and a surgical consent was signed.    COMPONENTS:   · Journey II BCS Oxinium Femoral Component: Size 5  · Journey II Tibial Baseplate: 4   · Posterior Stabilized Insert: 11  · Patella: 32mm    PERTINENT FINDINGS: Degenerative Arthritis    DETAILS OF PROCEDURE:  The patient was met in the preoperative area. The site was marked. The consent and H&P were reviewed. The patient was then wheeled back to the operative suite and transferred to the operative table. The patient underwent anesthesia. A tourniquet was placed on the upper thigh. Surgical alcohol was used to thoroughly clean the entire operative extremity.     The leg  was then prepped in the normal sterile fashion and surgical space suits were used for the entire operative team. New outer gloves were used by all sterile surgical team members after final draping. After a surgical timeout, the tourniquet was inflated.     In flexion, a midline knee incision was utilized centered on the patella and ending medial to the tibial tubercle. Dissection was carried down to the knee capsule. A midvastus ararthrotomy was completed. The patellar fat pad was excised. The MCL was minimally elevated to gain adequate exposure to the knee.      The patella was subluxed laterally. The patella was held vertical using 2 clamps, and was then cut using a saw. The patella was then sized, and the lug holes were drilled. Excess patellar bone was removed using a saw. The patella was then protected during this case using the metal patella shield.    The femoral canal was breached using the reamer. The canal was thoroughly irrigated. The intramedullary femoral jig was inserted. The distal cutting block was pinned in place and held with a kocher clamp. The cut was made with an oscillating saw. With all saw cuts, the soft tissues were protected with retractors. The sizing jig was placed onto the femur and set to the desired amount of external rotation. Rotation was checked against the epicondylar axis and Whitesides line. The femur was then sized. The size matching block was placed and secured with pins. The cuts were then made with oscillating saw in a routine fashion. All bone cuts were removed.     The tibial canal was then breached using the entry drill. The canal was thoroughly irrigated. Next the intramedullary guide was inserted down the tibial canal. The cutting jig was aligned with the medial third of the tibial tubercle. The height of the cut was measured and the cutting jig was then secured with pins. The slope and varus/valgus positioning was checked with an extramedullary yvrose which was attached to  the cutting jig.     The cut was then made using the oscillating saw, again ensuring that the retractors were in proper position to protect the collateral ligaments and the patellar tendon, as well as the neurovascular bundle and PCL posteriorly.     A lamina  was inserted into the notch with the knee in flexion and used to expose the posterior joint. Using a kocher and bovie the remaining medial and lateral menisci were removed. Excess posterior osteophytes were also removed with a osteotome, mallet, and rongeur as necessary.      Next the trial femoral component was inserted onto the distal femur and its proper position was verified.  He was held in place with one pin and the box with a BCS implant was prepared using the reamer and box osteotome.  Excess tissue was removed using a Bovie and rongeur.  The trial box was snapped into place and noted to fit perfectly.    Next a trial of the knee was performed using trial femoral and tibial trial components. Polyethylene trials were inserted as needed to gain appropriate stability. A drop yvrose was once again used to assess the varus/valgus alignment of the knee and the knee was noted to be in excellent alignment. Soft tissue releases were then performed as necessary to fine-tune the balancing of the knee.  After taking the knee through one final range of motion, the tibial rotation of the trial was noted.     We next turned our attention back to the tibia to finish the tibial preparation. The tibia was measured and sized. The tibial plate was aligned with the rotation from the trialing process and verified to be positioned near the medial third of the tibial tubercle. The tibial surface was then prepared for the keel .    The knee was thoroughly irrigated with sterile saline using a pulse-lavage system while the final tibial baseplate, femoral component and patellar component were opened. Cement was prepared and mixed using standard techniques. Outer gloves were  "changed before implant handling to ensure no soft tissue or oily material was exposed to the surfaces of the final implants. The bony knee surfaces were dried and the implants were cemented in place, starting with the tibia, then the femur and finally the patella. Excess cement was removed at each step. A trial poly was utilized during cementation for compression. The tourniquet was taken down and adequate hemostasis was achieved. The knee was thoroughly irrigated once again.     The soft tissues about the knee were then injected with an anesthetic cocktail. Care was taken to avoid the peroneal nerve and the neurovascular bundle posteriorly. The cement was allowed to harden. After the cement was fully set, the knee was ranged with various thickness of polyethylene trials to achieve full extension and adequate flexion. The knee was inspected for excess cement, which was removed. The real poly, of corresponding thickness was then opened and inserted into the knee. One final range of motion and stability test showed the knee to be in good condition with a well tracking patellar component.    The knee capsule was then closed with a running barbed suture. The knee was then closed in layers.  A sterile dressing was applied.    The patient was awoken from anesthesia, moved to the [facility] and taken to the recovery room in stable condition. Sponge and needle count were correct. There were no complications. Patient tolerated the procedure well.    R \"Zafar\" Saundra SALGUERO MD  Orthopaedic Surgery  [facility]  [phone]                    "

## 2021-10-15 NOTE — ANESTHESIA PROCEDURE NOTES
Airway  Date/Time: 10/15/2021 3:20 PM  End Time:10/15/2021 3:20 PM  Airway not difficult    General Information and Staff    Patient location during procedure: OR    Indications and Patient Condition  Indications for airway management: airway protection    Preoxygenated: yes  Mask difficulty assessment: 1 - vent by mask    Final Airway Details  Final airway type: endotracheal airway      Successful airway: ETT  Cuffed: yes   Successful intubation technique: direct laryngoscopy  Facilitating devices/methods: intubating stylet  Endotracheal tube insertion site: oral  Blade: Nury  Blade size: 3  ETT size (mm): 7.5  Cormack-Lehane Classification: grade I - full view of glottis  Placement verified by: chest auscultation and capnometry   Measured from: lips  ETT/EBT  to lips (cm): 19  Number of attempts at approach: 1  Assessment: lips, teeth, and gum same as pre-op and atraumatic intubation

## 2021-10-15 NOTE — ANESTHESIA PREPROCEDURE EVALUATION
Anesthesia Evaluation     Patient summary reviewed and Nursing notes reviewed   no history of anesthetic complications:  NPO Solid Status: > 8 hours  NPO Liquid Status: > 8 hours           Airway   Mallampati: II  TM distance: >3 FB  Neck ROM: full  No difficulty expected  Dental      Pulmonary - negative pulmonary ROS    breath sounds clear to auscultation  Cardiovascular - negative cardio ROS  Exercise tolerance: good (4-7 METS)    ECG reviewed  Rhythm: regular  Rate: normal        Neuro/Psych- negative ROS  GI/Hepatic/Renal/Endo    (+) morbid obesity,      Musculoskeletal     Abdominal     Abdomen: soft.   Substance History - negative use     OB/GYN negative ob/gyn ROS         Other   arthritis,                    Anesthesia Plan    ASA 3     general     intravenous induction     Anesthetic plan, all risks, benefits, and alternatives have been provided, discussed and informed consent has been obtained with: patient.    Plan discussed with CAA.

## 2021-10-15 NOTE — H&P
Orthopaedic Surgery  History & Physical For Elective Total Knee  Dr. ELAINA Arguello II  [phone]    HPI:  Patient is a 61 y.o. Not  or  female who presents with End-stage arthritis of the left knee. They failed conservative treatment of their knee pain and a thorough discussion of the risks and benefits of surgery was had. The patient wishes to continue with elective total knee replacement, they were scheduled and are here for surgery. They did get medical clearance as well as a thorough preoperative workup.    MEDICAL HISTORY  Past Medical History:   Diagnosis Date   • Arthritis    • Disease of thyroid gland     hypothyroid   • MRSA colonization    • Peripheral edema    • Rheumatoid arthritis (HCC)    • Spinal stenosis    ·   · History reviewed. No pertinent surgical history.  Prior to Admission medications    Medication Sig Start Date End Date Taking? Authorizing Provider   Abatacept (ORENCIA IV) Infuse 1,000 mg into a venous catheter Every 30 (Thirty) Days. 15th of every month   Last dose sept 15   Yes Magnus Zuluaga MD   acetaminophen (TYLENOL) 650 MG 8 hr tablet Take 650 mg by mouth Every 8 (Eight) Hours As Needed for Mild Pain . dont take preop   Yes Magnus Zuluaga MD   folic acid (FOLVITE) 1 MG tablet Take 1 mg by mouth Daily. Last dose 10/10   Yes Magnus Zuluaga MD   hydroCHLOROthiazide (HYDRODIURIL) 25 MG tablet Take 25 mg by mouth Daily. Do not take dos   Yes Magnus Zuluaga MD   indomethacin (INDOCIN) 50 MG capsule Take 50 mg by mouth Daily. Stop 1 week prior to surgery   Yes Magnus Zuluaga MD   levothyroxine (SYNTHROID, LEVOTHROID) 25 MCG tablet Take 25 mcg by mouth Daily. Take dos   Yes Magnus Zuluaga MD   methotrexate 2.5 MG tablet Take 15 mg by mouth 1 (One) Time Per Week. Sat   Stop 1 week prior to surgery per Ana at DR. Arguello's office   Yes Maguns Zuluaga MD   predniSONE (DELTASONE) 5 MG tablet Take 5 mg by mouth Daily. Take  "dos   Yes Magnus Zuluaga MD   traMADol (ULTRAM) 50 MG tablet Take 50 mg by mouth Every 6 (Six) Hours As Needed for Moderate Pain . DONT TAKE PREOP   Yes Magnus Zuluaga MD   phentermine 30 MG capsule Take 30 mg by mouth Every Morning.    Magnus Zuluaga MD   vitamin B-12 (CYANOCOBALAMIN) 500 MCG tablet Take 500 mcg by mouth Daily. Stop 1 week prior to surgery    Magnus Zuluaga MD   vitamin D3 125 MCG (5000 UT) capsule capsule Take 5,000 Units by mouth Daily. Stop 1 week prior to surgery    Magnus Zuluaga MD   ·   · No Known Allergies  Most Recent Immunizations   Administered Date(s) Administered   • Tdap 02/20/2010   ·   Social History     Tobacco Use   • Smoking status: Former Smoker     Packs/day: 1.00     Years: 20.00     Pack years: 20.00     Types: Cigarettes   • Smokeless tobacco: Never Used   Substance Use Topics   • Alcohol use: Not Currently   ·    Social History     Substance and Sexual Activity   Drug Use Never   ·     REVIEW OF SYSTEMS:  · Head: negative for headache  · Respiratory: negative for shortness of breath.   · Cardiovascular: negative for chest pain.   · Gastrointestinal: negative abdominal pain.   · Neurological: negative for LOC  · Psychiatric/Behavioral: negative for memory loss.   · All other systems reviewed and are negative    VITALS: Ht 157.5 cm (62\")   Wt 109 kg (241 lb)   BMI 44.08 kg/m²  Body mass index is 44.08 kg/m².    PHYSICAL EXAM:   · CONSTITUTIONAL: A&Ox3, No acute distress  · LUNGS: Equal chest rise, no shortness of air  · CARDIOVASCULAR: palpable peripheral pulses  · SKIN: no skin lesions in the area examined  · LYMPH: no lymphadenopathy in the area examined  · EXTREMITY: Knee  · Pulses:  Brisk Capillary Refill  · Sensation: Intact to Saphenous, Sural, Deep Peroneal, Superficial Peroneal, and Tibial Nerves and grossly throughout extremity  · Motor: 5/5 EHL/FHL/TA/GS motor complexes    RADIOLOGY REVIEW:   No radiology results for the last 7 " days    LABS:   Results for the past 24 hours: No results found for this or any previous visit (from the past 24 hour(s)).    IMPRESSION:  Patient is a 61 y.o. Not  or  female with end-stage arthritis of the left knee    PLAN:   · Surgery: Elective total knee arthroplasty  · Consent: The risks and benefits of operative versus nonoperative treatment were discussed. The patient elected to undergo operative treatment of their knee arthritis. The risks discussed included but were not limited to blood clots, MI, stroke, other medical complications, infection, damage to neurovascular structures, continued pain, hardware prominence, loss of range of motion, need for further procedures, and and risk of anesthesia..  No guarantees were made   · Disposition: Elective left Total Knee Arthroplasty today.    Pacheco Arguello II, MD  Orthopaedic Surgery  [facility]

## 2021-10-16 PROCEDURE — G0378 HOSPITAL OBSERVATION PER HR: HCPCS

## 2021-10-16 PROCEDURE — 63710000001 PREDNISONE PER 5 MG: Performed by: ORTHOPAEDIC SURGERY

## 2021-10-16 PROCEDURE — 97116 GAIT TRAINING THERAPY: CPT

## 2021-10-16 PROCEDURE — 97530 THERAPEUTIC ACTIVITIES: CPT

## 2021-10-16 PROCEDURE — 97535 SELF CARE MNGMENT TRAINING: CPT

## 2021-10-16 PROCEDURE — 97161 PT EVAL LOW COMPLEX 20 MIN: CPT

## 2021-10-16 PROCEDURE — 25010000002 CEFAZOLIN PER 500 MG: Performed by: ORTHOPAEDIC SURGERY

## 2021-10-16 PROCEDURE — 97110 THERAPEUTIC EXERCISES: CPT

## 2021-10-16 RX ORDER — OXYCODONE HYDROCHLORIDE AND ACETAMINOPHEN 5; 325 MG/1; MG/1
1 TABLET ORAL EVERY 4 HOURS PRN
Qty: 50 TABLET | Refills: 0 | Status: SHIPPED | OUTPATIENT
Start: 2021-10-16 | End: 2022-10-05

## 2021-10-16 RX ORDER — ASPIRIN 325 MG
325 TABLET, DELAYED RELEASE (ENTERIC COATED) ORAL DAILY
Qty: 30 TABLET | Refills: 0 | Status: SHIPPED | OUTPATIENT
Start: 2021-10-16

## 2021-10-16 RX ADMIN — MELOXICAM 15 MG: 15 TABLET ORAL at 08:02

## 2021-10-16 RX ADMIN — OXYCODONE 10 MG: 5 TABLET ORAL at 20:49

## 2021-10-16 RX ADMIN — PREDNISONE 5 MG: 5 TABLET ORAL at 08:02

## 2021-10-16 RX ADMIN — OXYCODONE 10 MG: 5 TABLET ORAL at 12:51

## 2021-10-16 RX ADMIN — CEFAZOLIN SODIUM 2 G: 10 INJECTION, POWDER, FOR SOLUTION INTRAVENOUS at 04:27

## 2021-10-16 RX ADMIN — SODIUM CHLORIDE 100 ML/HR: 9 INJECTION, SOLUTION INTRAVENOUS at 08:02

## 2021-10-16 RX ADMIN — HYDROCHLOROTHIAZIDE 25 MG: 25 TABLET ORAL at 08:02

## 2021-10-16 RX ADMIN — ASPIRIN 325 MG: 325 TABLET, COATED ORAL at 08:02

## 2021-10-16 RX ADMIN — LEVOTHYROXINE SODIUM 25 MCG: 0.03 TABLET ORAL at 08:02

## 2021-10-16 RX ADMIN — OXYCODONE 10 MG: 5 TABLET ORAL at 04:27

## 2021-10-16 NOTE — PLAN OF CARE
Goal Outcome Evaluation:        Outcome Summary  Pt is a 61 year old female who presents to [facility] s/p L TKA on 10/15. Pt lives at home with her adult son and dght-in-law and two young grandchildren with two steps to enter and no hand rail. Pt's children work 3rd shift and are unable to assist much at home. Pt demonstrated heavy reliance on bed rails during supine to sit transfer, CGA for sit<>stand w/ use of crutches, and CGA for ambulation x10ft w/ crutches, LLE WBAT. Pt ascend/descend 2 steps w/ crutches and v/c for sequencing with greater difficulty desending steps than ascending. Recommend inpatient rehab as pt is unsafe to return home without support. PT will continue to follow.

## 2021-10-16 NOTE — PLAN OF CARE
Goal Outcome Evaluation:  Plan of Care Reviewed With: patient        Progress: improving   Patent working with physical therapy today, needing rehab at discharge. Pain medication given as ordered and effective, good appetite and fluids maintained. Dressing to left lower extremity intact, 0 drainage noted, hesham, ace wrap. Ice pack changed.

## 2021-10-16 NOTE — PLAN OF CARE
Goal Outcome Evaluation:           Progress: improving  Outcome Summary: Patient had a LTK done 10/15, she has had no complaints of pain and has been up to the bedside camode multiple times tonight. She has been afraid to walk in the [facility]ith tonight because she did not want to have her knee lock up and fall. Patient did let me walk her in the room and to the chair. She will be ready to walk the halls later today.

## 2021-10-16 NOTE — PROGRESS NOTES
Discharge Planning Assessment  [facility]     Patient Name: Amita Arguello  MRN: [id number]  Today's Date: 10/16/2021    Admit Date: 10/15/2021       Discharge Plan     Row Name 10/16/21 1726       Plan    Plan DC plan: referral to [facility]. Pending acceptance. Will require precert. No PASRR required for [facility].    Plan Comments PT recommending IP rehab. Referral made to [facility] via [facility] as per Dr. Arguello's office care plan. Confirmed d/c plan with pt via phone. Notified liaison of referral. Pending acceptanct.              Continued Care and Services - Admitted Since 10/15/2021     Destination     Service Provider Request Status Selected Services Address Phone Fax Patient Preferred    [facility]  Pending - Request Sent N/A [address] [address] [place] [address] [phone] --              Expected Discharge Date and Time     Expected Discharge Date Expected Discharge Time    Oct 16, 2021               Ana Alvarenga RN

## 2021-10-16 NOTE — THERAPY EVALUATION
Patient Name: Amita Arguello  : 1960    MRN: [id number]                              Today's Date: 10/16/2021       Admit Date: 10/15/2021    Visit Dx: No diagnosis found.  Patient Active Problem List   Diagnosis   • Total knee replacement status     Past Medical History:   Diagnosis Date   • Arthritis    • Disease of thyroid gland     hypothyroid   • MRSA colonization    • Peripheral edema    • Rheumatoid arthritis (HCC)    • Spinal stenosis      History reviewed. No pertinent surgical history.   General Information     Row Name 10/16/21 1132          Physical Therapy Time and Intention    Document Type evaluation  -     Mode of Treatment physical therapy  -     Row Name 10/16/21 1132          General Information    Prior Level of Function independent:; ADL's; all household mobility; driving  Pt uses crutches for stairs at home prior to TKA  -     Existing Precautions/Restrictions weight bearing; other (see comments)  LLE WBAT  -     Row Name 10/16/21 1132          Living Environment    Lives With child(anna), adult; grandchild(anna)  Pt lives with her son and dght in law and two grandchildren (7&8), but are unable to help due to working third shift  -     Row Name 10/16/21 1132          Home Main Entrance    Number of Stairs, Main Entrance two  At PLOF pt used cruthes to ascend/descend  -     Stair Railings, Main Entrance none; other (see comments)  Housee wall on the left  -     Row Name 10/16/21 1132          Stairs Within Home, Primary    Number of Stairs, Within Home, Primary none  -     Row Name 10/16/21 1132          Cognition    Orientation Status (Cognition) oriented x 4  -     Row Name 10/16/21 1132          Safety Issues, Functional Mobility    Safety Issues Affecting Function (Mobility) safety precaution awareness; at risk behavior observed; safety precautions follow-through/compliance  -     Impairments Affecting Function (Mobility) balance; endurance/activity tolerance; pain;  postural/trunk control; range of motion (ROM); strength  -CUONG           User Key  (r) = Recorded By, (t) = Taken By, (c) = Cosigned By    Initials Name Provider Type    CUONG Bev Ramsey, PT Physical Therapist               Mobility     Row Name 10/16/21 1138          Bed Mobility    Bed Mobility supine-sit; scooting/bridging  -CUONG     Scooting/Bridging [place] (Bed Mobility) minimum assist (75% patient effort)  -CUONG     Supine-Sit [place] (Bed Mobility) contact guard  -CUONG     Assistive Device (Bed Mobility) head of bed elevated; bed rails  -     Comment (Bed Mobility) Pt required a heavy reliance on bed rails for supine to sit transfer and required min A to scoot to eob due to bed rail being in the way  -     Row Name 10/16/21 1138          Sit-Stand Transfer    Sit-Stand [place] (Transfers) contact guard  -CUONG     Assistive Device (Sit-Stand Transfers) walker, front-wheeled; crutches, axillary  -CUONG     Row Name 10/16/21 1138          Gait/Stairs (Locomotion)    [place] Level (Gait) contact guard  -CUONG     Assistive Device (Gait) walker, front-wheeled; crutches, axillary  -CUONG     Distance in Feet (Gait) 10ft  -CUONG     Assistive Device (Stairs) crutches, axillary  -CUONG     Handrail Location (Stairs) left side (ascending); right side (descending)  -CUONG     Number of Steps (Stairs) 2  -CUONG     Ascending Technique (Stairs) step-to-step  -CUONG     Descending Technique (Stairs) step-to-step  -CUONG     Stairs, Safety Issues balance decreased during turns; weight-shifting ability decreased  -CUONG     Comment (Gait/Stairs) Pt demonstrated use of RW during first attempt x10ft, then used crutches second attempt x5ft. Pt demonstrated improved ability to use crutches than to use RW with correct form. Pt demonstrated difficulty during turn on step w/ crutches, but did not demonstrate LOB. Pt required v/c for sequencing during stair training, and ascend/descent steps CGA w/out LOB w/ use of crutches.  -           User  Key  (r) = Recorded By, (t) = Taken By, (c) = Cosigned By    Initials Name Provider Type     Bev Ramsey, ERIKA Physical Therapist               Obj/Interventions     Row Name 10/16/21 1144          Range of Motion Comprehensive    Comment, General Range of Motion LLE knee ext ROM 11 degrees, LLE knee flex ROM 72. LLE hip and ankle ROM WFL, RLE ROM WFL  -[facility] Name 10/16/21 1144          Strength Comprehensive (MMT)    General Manual Muscle Testing (MMT) Assessment lower extremity strength deficits identified  -     Comment, General Manual Muscle Testing (MMT) Assessment LLE knee ext 2-/5, knee flex 3/5, ankle DF 4/5, hip 3+/5. RLE hip 3+/5, knee flex/ext and ankle DF 4/5.  -[facility] Name 10/16/21 1144          Motor Skills    Therapeutic Exercise knee  -[facility] Name 10/16/21 1144          Knee (Therapeutic Exercise)    Knee (Therapeutic Exercise) --  Discussed and educated to the importance of knee therex  -CUONG     Row Name 10/16/21 1144          Balance    Balance Assessment sitting static balance; sitting dynamic balance; standing static balance; standing dynamic balance  -     Static Sitting Balance WFL  -     Dynamic Sitting Balance WFL  -     Static Standing Balance WFL; supported  -     Dynamic Standing Balance mild impairment; supported  -[facility] Name 10/16/21 1144          Sensory Assessment (Somatosensory)    Sensory Assessment (Somatosensory) sensation intact  -           User Key  (r) = Recorded By, (t) = Taken By, (c) = Cosigned By    Initials Name Provider Type    CUONG Bev Ramsey PT Physical Therapist               Goals/Plan     Row Name 10/16/21 1154          Bed Mobility Goal 1 (PT)    Activity/Assistive Device (Bed Mobility Goal 1, PT) bed mobility activities, all  -     [place] Level/Cues Needed (Bed Mobility Goal 1, PT) independent  -     Time Frame (Bed Mobility Goal 1, PT) long term goal (LTG); 2 weeks  -[facility] Name 10/16/21 1154          Transfer  Goal 1 (PT)    Activity/Assistive Device (Transfer Goal 1, PT) transfers, all  -CUONG     [place] Level/Cues Needed (Transfer Goal 1, PT) modified independence  -CUONG     Time Frame (Transfer Goal 1, PT) long term goal (LTG); 2 weeks  -     Row Name 10/16/21 1154          Gait Training Goal 1 (PT)    Activity/Assistive Device (Gait Training Goal 1, PT) gait (walking locomotion)  -CUONG     [place] Level (Gait Training Goal 1, PT) modified independence  -CUONG     Distance (Gait Training Goal 1, PT) 100ft  -CUONG     Time Frame (Gait Training Goal 1, PT) long term goal (LTG); 2 weeks  -     Row Name 10/16/21 1154          Stairs Goal 1 (PT)    [place] Level/Cues Needed (Stairs Goal 1, PT) standby assist  -     Number of Stairs (Stairs Goal 1, PT) 2 steps  -CUONG     Time Frame (Stairs Goal 1, PT) long term goal (LTG); 2 weeks  -           User Key  (r) = Recorded By, (t) = Taken By, (c) = Cosigned By    Initials Name Provider Type    Bev Lord, PT Physical Therapist               Clinical Impression     Row Name 10/16/21 1140          Pain    Additional Documentation Pain Scale: Numbers Pre/Post-Treatment (Group)  -     Row Name 10/16/21 4786          Pain Scale: Numbers Pre/Post-Treatment    Pretreatment Pain Rating 6/10  -CUONG     Posttreatment Pain Rating 6/10  -CUONG     Pain Location - Side Left  -     Pain Location knee  -     Pain Intervention(s) Cold applied; Repositioned; Nursing Notified  -     Row Name 10/16/21 5438          Plan of Care Review    Outcome Summary Pt is a 61 year old female who presents to [facility] s/p L TKA on 10/15. Pt lives at home with her adult son and dght-in-law and two young grandchildren with two steps to enter and no hand rail. Pt's children work 3rd shift and are unable to assist much at home. Pt demonstrated heavy reliance on bed rails during supine to sit transfer, CGA for sit<>stand w/ use of crutches, and CGA for ambulation x10ft w/ crutches, LLE WBAT. Pt  ascend/descend 2 steps w/ crutches and v/c for sequencing with greater difficulty desending steps than ascending. Pt will likely progress during hospital stay and will recommend inpatient rehab as pt is unsafe to return home without support. PT will continue to follow.  -     Row Name 10/16/21 1147          Therapy Assessment/Plan (PT)    Predicted Duration of Therapy Intervention (PT) Until d/c  -     Row Name 10/16/21 1147          Positioning and Restraints    Pre-Treatment Position in bed  -     Post Treatment Position chair  -CUONG     In Chair reclined; call light within reach; encouraged to call for assist; with family/caregiver; notified nsg  -CUONG           User Key  (r) = Recorded By, (t) = Taken By, (c) = Cosigned By    Initials Name Provider Type    Bev Lord PT Physical Therapist               Outcome Measures     Row Name 10/16/21 1156          How much help from another person do you currently need...    Turning from your back to your side while in flat bed without using bedrails? 3  -CUONG     Moving from lying on back to sitting on the side of a flat bed without bedrails? 3  -CUONG     Moving to and from a bed to a chair (including a wheelchair)? 3  -CUONG     Standing up from a chair using your arms (e.g., wheelchair, bedside chair)? 3  -CUONG     Climbing 3-5 steps with a railing? 3  -CUONG     To walk in hospital room? 3  -CUONG     AM-PAC 6 Clicks Score (PT) 18  -     Row Name 10/16/21 1156          Functional Assessment    Outcome Measure Options AM-PAC 6 Clicks Basic Mobility (PT)  -           User Key  (r) = Recorded By, (t) = Taken By, (c) = Cosigned By    Initials Name Provider Type    Bev Lord PT Physical Therapist                             Physical Therapy Education                 Title: PT OT SLP Therapies (Done)     Topic: Physical Therapy (Done)     Point: Mobility training (Done)     Learning Progress Summary           Patient Acceptance, E,TB, VU by CUONG at 10/16/2021 1156                    Point: Home exercise program (Done)     Learning Progress Summary           Patient Acceptance, E,TB, VU by  at 10/16/2021 1156                   Point: Body mechanics (Done)     Learning Progress Summary           Patient Acceptance, E,TB, VU by  at 10/16/2021 1156                   Point: Precautions (Done)     Learning Progress Summary           Patient Acceptance, E,TB, VU by  at 10/16/2021 1156                               User Key     Initials Effective Dates Name Provider Type Discipline     08/23/21 -  Bev Ramsey, PT Physical Therapist PT              PT Recommendation and Plan  Planned Therapy Interventions (PT): balance training, bed mobility training, gait training, home exercise program, motor coordination training, neuromuscular re-education, patient/family education, postural re-education, ROM (range of motion), stair training, strengthening, transfer training  Outcome Summary: Pt is a 61 year old female who presents to [facility] s/p L TKA on 10/15. Pt lives at home with her adult son and dght-in-law and two young grandchildren with two steps to enter and no hand rail. Pt's children work 3rd shift and are unable to assist much at home. Pt demonstrated heavy reliance on bed rails during supine to sit transfer, CGA for sit<>stand w/ use of crutches, and CGA for ambulation x10ft w/ crutches, LLE WBAT. Pt ascend/descend 2 steps w/ crutches and v/c for sequencing with greater difficulty desending steps than ascending. Pt will likely progress during hospital stay and will recommend inpatient rehab as pt is unsafe to return home without support. PT will continue to follow.     Time Calculation:    PT Charges     Row Name 10/16/21 1615 10/16/21 1157          Time Calculation    Start Time 1430  - 0847  -     Stop Time 1510  - 0937  [facility]     Time Calculation (min) 40 min  - 50 min  -     PT Received On 10/16/21  - 10/16/21  -     PT - Next Appointment 10/17/21  -  10/17/21  -CUONG     PT Goal Re-Cert Due Date -- 10/30/21  -CUONG            Time Calculation- PT    Total Timed Code Minutes- PT 40 minute(s)  -MC 30 minute(s)  -CUONG           User Key  (r) = Recorded By, (t) = Taken By, (c) = Cosigned By    Initials Name Provider Type    Honey Siddiqui, PTA Physical Therapy Assistant    Bev Lord, PT Physical Therapist              Therapy Charges for Today     Code Description Service Date Service Provider Modifiers Qty    55308696897 HC PT EVAL LOW COMPLEXITY 4 10/16/2021 Bev Ramsey, PT GP 1    25751293225 HC PT THERAPEUTIC ACT EA 15 MIN 10/16/2021 Bev Ramsey, PT GP 2          PT G-Codes  Outcome Measure Options: AM-PAC 6 Clicks Basic Mobility (PT)  AM-PAC 6 Clicks Score (PT): 18    Bev Ramsey PT  10/16/2021

## 2021-10-16 NOTE — DISCHARGE SUMMARY
Orthopaedic Discharge Summary  Dr. DELANEY “Zafar” Saundra SALGUERO  [phone]    NAME: Amita ARRON Saundra PCP: Edvin Sainz MD   :  MRN: [id number]  [id number] LOS:  ADMIT: 0 days  10/15/2021   AGE/SEX: 61 y.o. female DC:  today             · Admitting Diagnosis: Osteoarthritis of left knee [M17.12]  · Total knee replacement status [Z96.659]    · Surgery Performed: [place] TOTAL KNEE ARTHROPLASTY [32352] (TOTAL KNEE ARTHROPLASTY)    · Discharge Medications:         Discharge Medications      New Medications      Instructions Start Date   aspirin  MG tablet   325 mg, Oral, Daily      oxyCODONE-acetaminophen 5-325 MG per tablet  Commonly known as: PERCOCET   1 tablet, Oral, Every 4 Hours PRN         Continue These Medications      Instructions Start Date   acetaminophen 650 MG 8 hr tablet  Commonly known as: TYLENOL   650 mg, Oral, Every 8 Hours PRN, dont take preop      folic acid 1 MG tablet  Commonly known as: FOLVITE   1 mg, Oral, Daily, Last dose 10/10      hydroCHLOROthiazide 25 MG tablet  Commonly known as: HYDRODIURIL   25 mg, Oral, Daily, Do not take dos       indomethacin 50 MG capsule  Commonly known as: INDOCIN   50 mg, Oral, Daily, Stop 1 week prior to surgery      levothyroxine 25 MCG tablet  Commonly known as: SYNTHROID, LEVOTHROID   25 mcg, Oral, Daily, Take dos       methotrexate 2.5 MG tablet   15 mg, Oral, Weekly, Sat  Stop 1 week prior to surgery per Ana at DR. Arguello's office      ORENCIA IV   1,000 mg, Intravenous, Every 30 Days, 15th of every month   Last dose sept 15      phentermine 30 MG capsule   30 mg, Oral, Every Morning      predniSONE 5 MG tablet  Commonly known as: DELTASONE   5 mg, Oral, Daily, Take dos       traMADol 50 MG tablet  Commonly known as: ULTRAM   50 mg, Oral, Every 6 Hours PRN, DONT TAKE PREOP      vitamin B-12 500 MCG tablet  Commonly known as: CYANOCOBALAMIN   500 mcg, Oral, Daily, Stop 1 week prior to surgery       vitamin D3 125 MCG (5000 UT) capsule capsule   5,000 Units,  Oral, Daily, Stop 1 week prior to surgery              · Vitals:     Vitals:    10/15/21 1744 10/15/21 1820 10/15/21 2124 10/16/21 0528   BP: 127/52 138/82 113/65 110/62   BP Location: Left arm Left arm     Patient Position: Lying Lying     Pulse: 58 58 79 93   Resp: 12 15 18 16   Temp: 97.3 °F (36.3 °C) 97.8 °F (36.6 °C) 98.6 °F (37 °C) 99.2 °F (37.3 °C)   TempSrc: Temporal Oral Oral    SpO2: 96% 97% 96% 93%   Weight:    112 kg (246 lb 14.6 oz)   Height:           · Labs:      Admission on 10/15/2021   Component Date Value Ref Range Status   • COVID19 10/15/2021 Not Detected  Not Detected - Ref. Range Final   • Glucose 10/15/2021 127* 65 - 99 mg/dL Final   • BUN 10/15/2021 14  8 - 23 mg/dL Final   • Creatinine 10/15/2021 0.75  0.57 - 1.00 mg/dL Final   • Sodium 10/15/2021 135* 136 - 145 mmol/L Final   • Potassium 10/15/2021 3.9  3.5 - 5.2 mmol/L Final   • Chloride 10/15/2021 100  98 - 107 mmol/L Final   • CO2 10/15/2021 23.0  22.0 - 29.0 mmol/L Final   • Calcium 10/15/2021 8.9  8.6 - 10.5 mg/dL Final   • eGFR Non  Amer 10/15/2021 79  >60 mL/min/1.73 Final   • BUN/Creatinine Ratio 10/15/2021 18.7  7.0 - 25.0 Final   • Anion Gap 10/15/2021 12.0  5.0 - 15.0 mmol/L Final   • WBC 10/15/2021 22.20* 3.40 - 10.80 10*3/mm3 Final   • RBC 10/15/2021 4.14  3.77 - 5.28 10*6/mm3 Final   • Hemoglobin 10/15/2021 13.0  12.0 - 15.9 g/dL Final   • Hematocrit 10/15/2021 37.9  34.0 - 46.6 % Final   • MCV 10/15/2021 91.6  79.0 - 97.0 fL Final   • MCH 10/15/2021 31.5  26.6 - 33.0 pg Final   • MCHC 10/15/2021 34.4  31.5 - 35.7 g/dL Final   • RDW 10/15/2021 15.0  12.3 - 15.4 % Final   • RDW-SD 10/15/2021 48.1  37.0 - 54.0 fl Final   • MPV 10/15/2021 7.4  6.0 - 12.0 fL Final   • Platelets 10/15/2021 364  140 - 450 10*3/mm3 Final   • Extra Tube 10/15/2021 Hold for add-ons.   Final    Auto resulted.        No results found.    · Hospital Course:   61 y.o. female was admitted to [facility] to services of Pacheco Arguello  "MD NOEMY with Osteoarthritis of left knee [M17.12]  Total knee replacement status [Z96.659] on 10/15/2021 and underwent [place] TOTAL KNEE ARTHROPLASTY [18892] (TOTAL KNEE ARTHROPLASTY). Post-operatively the patient transferred to the floor where the patient underwent mobilization therapy. Opioids were titrated to achieve appropriate pain management to allow for participation in mobilization exercises. Vital signs and laboratory values are now within safe parameters for discharge. The dressings and/or incision is intact without signs or symptoms of active infection. Operative extremity neurovascular status remains intact as compared to the preoperative exam. Appropriate education re: incision care, activity levels, medications, and follow up visits was completed and all questions were answered. The patient is now deemed stable for discharge.    HOME: The patient progressed well with physical therapy. There were cleared for discharge to home. The jorge luisn was sent home in good condition}.       R \"Zafar\" Saundra SALGUERO MD  Orthopaedic Surgery  [facility]  [phone]                                               "

## 2021-10-16 NOTE — DISCHARGE INSTRUCTIONS
Total Knee  Discharge Instructions  Dr. ELAINA Luna” [place] II  [phone]    • INCISION CARE  o Wash your hands prior to dressing changes  o CHITO Wound VAC: Postoperatively you had a CHITO Wound Vac placed on the incision. This was placed under sterile conditions in the operating room. It remains in place for 7 days postoperatively. After 7 days, the entire dressing must be removed, including all of the sticky adhesive. The dressing and battery pack provide gentle suction to the incision and provide several benefits over a traditional dressing:  - It maintains the sterile environment of the OR and reduces the risk of infection  - The suction removes unwanted buildup of blood/hematoma under the skin to reduce swelling  - The suction also promotes fresh blood supply to the skin and soft tissue to speed up healing  - The postoperative scar is reduced in size  - Showering is permitted immediately after surgery, but the battery pack must be protected or removed during the shower.   o After 7 days the CHITO Wound Vac is removed. If there is no drainage, no dressing is required. If there is some scant drainage a dry bandage can be applied and changed daily until seen in the office or until the drainage stops.   o No creams or ointments to the incisions until 4 weeks post op.  o Do not touch or pick at the incision  o Check incision every day and notify surgeon immediately if any of the following signs or symptoms are seen:  - Increase in redness  - Increase in swelling around the incision and of the entire extremity  - Increase in pain  - NEW drainage or oozing from the incision  - Pulling apart of the edges of the incision  - Increase in overall body temperature (greater than 100.4 degrees)  o Zip-Line: your incision was closed with a state of the art device.   - Is a non-invasive and easy to use wound closure device that replaces sutures, staples and glue for surgical incisions  - It minimizes scarring and eliminating  “railroad” marks that come with staples or sutures  - It makes removal as atraumatic as peeling off a bandage  - Can be removed at home or by a physical therapist or nurse at 14 days postoperatively    • ACTIVITIES  o Exercises:  - Physical therapy will begin immediately while in the hospital. Patients going to a nursing home will get therapy as part of their care at the SNU/SNF facility. Patients going home may also have a therapist come to the house to help them mobilize until they can safely get to an outpatient therapy facility.  - Elevate the affected leg most of the day during the first week post operatively. Caution must be taken to avoid pillow placement directly under the heel of the leg, as this can cause pressure ulcers even with a soft pillow. All pillows and blankets should be placed underneath of the thigh and calf so that the heel is free-floating.  - Use cold packs for 20-30 minutes approximately 5 times per day.  - You should perform the daily stretching and strengthening exercises as taught by the therapist as often as possible. This can be done many times a day.  - Full weight bearing is allowed after surgery. It will be sore/painful to put weight on the leg, but this will help the bone to heal and prevent complications such as pneumonia, bed sores and blood clots. Mobilization is vital to the recovery process.  o Activities of Daily Living:  - No tub baths, hot tubs, or swimming pools for 4 weeks.  - May shower and let water run over the incision immediately after surgery. The battery pack of the CHITO Wound Vac must be protected or removed while in the shower. After the CHITO is removed 7 days after surgery showering is permitted as long as there is no drainage from the wound.     • Restrictions  o Weight: It is ok to allow full weight bearing after surgery. Weight on the leg actually quickens the recovery process. While it will be sore/painful to put weight on the leg, it is safe to do so. Hip  replacement after hip fracture has a much slower recover process. It can take months to heal fully from a hip fracture and patients even make some slow benefits up to a year afterwards.   o Driving: Many patients have questions about when it is safe to return to driving. The answer is that this is extremely variable. It depends on the extent of the surgery, as well as how quickly you heal. Certainly left leg surgeries make returning to driving easier while right leg surgeries require more extensive rehabilitation before driving can be safe. Until you can press down on the brake hard, and are off of all narcotics, driving is not permitted. Your surgeon cannot “clear” you to return to driving, only you can make the decision when you feel it is safe.    • Medications  o Anticoagulants: After upper extremity surgery most patients do not require an anticoagulant unless you have another injury that will be keeping you from mobilizing. Lower extremity surgery typically does require use of an anticoagulation medicine.   - IF YOU HAD LOWER EXTREMITY SURGERY AND ARE NOT DISCHARGED HOME WITH ANY ANTICOAGULANT MEDICINE YOU SHOULD TAKE ASPIRIN 325mg DAILY FOR 30 DAYS POSTOPERATIVELY.  - If you are discharged home with an anticoagulant such as Aspirin, Xarelto, Eliquis, Coumadin, or Lovenox, follow these simple instructions:   - Notify surgeon immediately if any adolph bleeding is noted in the urine, stool, emesis, or from the nose or the incision. Blood in the stool will often appear as black rather than red. Blood in urine may appear as pink. Blood in emesis may appear as brown/black like coffee grounds.  - You will need to apply pressure for longer periods of time to any cuts or abrasions to stop bleeding  - Avoid alcohol while taking anticoagulants  - Most anticoagulants are to be taken for 30 days postoperatively. After this time, you may stop using them unless instructed otherwise.   - If you were already taking an  anticoagulant (commonly Aspirin, Coumadin, or Plavix) you will likely be resuming your normal dose postoperatively and will be continuing that medication at the discretion of the prescribing physician.  o Stool Softeners: You will be at greater risk of constipation after surgery due to being less mobile and the pain medications.  - Take stool softeners as needed. Over the counter Colace 100 mg 1-2 capsules twice daily can be taken.  - If stools become too loose or too frequent, please decreases the dosage or stop the stool softener.  - If constipation occurs despite use of stool softeners, you are to continue the stool softeners and add a laxative (Milk of Magnesia 1 ounce daily as needed)  - Drink plenty of fluids, and eat fruits and vegetables during your recovery time. Getting up and mobilizing will help the bowels to recover their regular function, as will weaning off of all narcotics when the pain becomes tolerable.  o Pain Medications: Utilized after surgery are narcotics. This is some general information about these medications.  - CLASSIFICATION: Pain medications are called Opioids and are narcotics  - LEGALITIES: It is illegal to share narcotics with others  - DRIVING: it is illegal to drive while under the influence of narcotics. Doing so is a DUI.  - POTENTIAL SIDE EFFECTS: nausea, vomiting, itching, dizziness, drowsiness, dry mouth, constipation, and difficulty urinating.  - POTENTIAL ADVERSE EFFECTS:  - Opioid tolerance can develop with use of pain medications and this simply means that it requires more and more of the medication to control pain. However, this is seen more in patients that use opioids for longer periods of time.  - Opioid dependence can develop with use of Opioids. People with opioid dependence will experience withdrawal symptoms upon cessation of the medication.  - Opioid addiction can develop with use of Opioids. The incidence of this is very unlikely in patients who take the  medications as ordered and stop the medications as instructed.  - Opioid overdose can be dangerous, but is unlikely when the medication is taken as ordered and stopped when ordered. It is important not to mix opioids with alcohol as this can lead to over sedation and respiratory difficulty.  - DOSAGE:  - After the initial surgical pain begins to resolve, you may begin to decrease the pain medication. By the end of a few weeks, you should be off of pain medications.  - Refills will not be given by the office during evening hours, on weekends, or after 6 weeks post-op. You are responsible for weaning off of pain medication. You can increase the time between narcotic pills, taking one every 4 then 6 then 8 hours and so on.  - To seek refills on pain medications during the initial 6-week post-operative period, you must call the office to request the refill. The office will then notify you when to  the prescription. DO NOT wait until you are out of the medication to request a refill. Prescriptions will not be filled over the weekend and depending on the schedule, it may take a couple days for the prescription to be available. Someone will have to pick the prescription in person at the office.    • FOLLOW-UP VISITS  o You will need to follow up in the office with your surgeon in 3 weeks, or as instructed elsewhere in your discharge paperwork. Please call this number [phone] to schedule this appointment. If you are going to an SNF/SNU facility, they will arrange for you to follow up in the office.  o If you have any concerns or suspected complications prior to your follow up visit, please call the office. Do not wait until your appointment time if you suspect complications. These will need to be addressed in the office promptly.      Pacheco Arguello II, MD  Orthopaedic Surgery  [facility]

## 2021-10-16 NOTE — THERAPY TREATMENT NOTE
Subjective: Pt agreeable to therapeutic plan of care. Patient not feeling comfortable enough to go home due to everyone there will be working or sleeping and can't help her. Has agreed to go to rehab if it's covered by ins.     Objective:     Bed mobility - CGA and Min-A instr use of belt to lift LLE back into bed  Transfers - CGA and with rolling walker  Ambulation - 60 feet CGA and with rolling walker with vc's for proper tech     Pain: L knee but did not rate  Education: Provided education on importance of mobility and skilled verbal / tactile cueing throughout intervention.     Assessment: Amita Arguello presents with functional mobility impairments which indicate the need for skilled intervention. Tolerating session today without incident. Went over HEP while supine and seated, presents with decreased L hip/knee flex and req assist for SLR and heelslides. ROM seated 15-72 deg with 20 deg ext lag. Heavy dependent on UE's using rwx and tries to keep LLE stiff and sl circumduct to advance, can correct with feedback. Also limted by R knee and fallen arches limiting her mobility. Supposed to have R TKR in near future. Would benefit from rehab at [place]. Will continue to follow and progress as tolerated.     Plan/Recommendations:   Pt would benefit from Inpatient Rehabilitation placement at discharge from facility and requires no DME at discharge.   Pt desires Inpatient Rehabilitation placement at discharge. Pt cooperative; agreeable to therapeutic recommendations and plan of care.     Basic Mobility 6-click:  Rollin = Total, A lot = 2, A little = 3; 4 = None  Supine>Sit:   1 = Total, A lot = 2, A little = 3; 4 = None   Sit>Stand with arms:  1 = Total, A lot = 2, A little = 3; 4 = None  Bed>Chair:   1 = Total, A lot = 2, A little = 3; 4 = None  Ambulate in room:  1 = Total, A lot = 2, A little = 3; 4 = None  3-5 Steps with railin = Total, A lot = 2, A little = 3; 4 = None  Score: 17      Post-Tx  Position: Supine with HOB Elevated and Call light and personal items within reach  PPE: gloves, surgical mask, eyewear protection

## 2021-10-16 NOTE — PLAN OF CARE
Assessment: Amita Arguello presents with functional mobility impairments which indicate the need for skilled intervention. Tolerating session today without incident. Went over HEP while supine and seated, presents with decreased L hip/knee flex and req assist for SLR and heelslides. ROM seated 15-72 deg with 20 deg ext lag. Heavy dependent on UE's using rwx and tries to keep LLE stiff and sl circumduct to advance, can correct with feedback. Also limted by R knee and fallen arches limiting her mobility. Supposed to have R TKR in near future. Would benefit from rehab at [place]. Will continue to follow and progress as tolerated.

## 2021-10-17 PROCEDURE — 97110 THERAPEUTIC EXERCISES: CPT

## 2021-10-17 PROCEDURE — G0378 HOSPITAL OBSERVATION PER HR: HCPCS

## 2021-10-17 PROCEDURE — 97535 SELF CARE MNGMENT TRAINING: CPT

## 2021-10-17 PROCEDURE — 63710000001 PREDNISONE PER 5 MG: Performed by: ORTHOPAEDIC SURGERY

## 2021-10-17 PROCEDURE — 97116 GAIT TRAINING THERAPY: CPT

## 2021-10-17 RX ADMIN — OXYCODONE 10 MG: 5 TABLET ORAL at 05:03

## 2021-10-17 RX ADMIN — HYDROCHLOROTHIAZIDE 25 MG: 25 TABLET ORAL at 09:02

## 2021-10-17 RX ADMIN — ASPIRIN 325 MG: 325 TABLET, COATED ORAL at 09:01

## 2021-10-17 RX ADMIN — MELOXICAM 15 MG: 15 TABLET ORAL at 09:02

## 2021-10-17 RX ADMIN — LEVOTHYROXINE SODIUM 25 MCG: 0.03 TABLET ORAL at 09:02

## 2021-10-17 RX ADMIN — OXYCODONE 10 MG: 5 TABLET ORAL at 10:16

## 2021-10-17 RX ADMIN — PREDNISONE 5 MG: 5 TABLET ORAL at 09:02

## 2021-10-17 NOTE — PLAN OF CARE
Assessment: Amita A Sweet presents with functional mobility impairments which indicate the need for skilled intervention. Tolerating session today without incident.Pt having more difficulty overall with mobility and range . Performed AAROM LLE while supine and req more assist with getting to EOB. Needed assist with brief and to clean up. Will greatly benefit from rehab at [place]. Will continue to follow and progress as tolerated.

## 2021-10-17 NOTE — PROGRESS NOTES
"Patient is postop day 2 knee replacement surgery.  She seems to be doing just fine but due to limited assistance at home and the fact that her contralateral leg is so weak from her severe arthritis, she will need inpatient rehab.  From my standpoint she is okay to be discharged as soon as a bed is available.  I suspect this will happen tomorrow, but I will have everything ready for to be discharged today in case.    R \"Zafar\" Saundra SALGUERO MD  Orthopaedic Surgery  [facility]  [phone] - [place] Office  [phone] - [place] Office    "

## 2021-10-17 NOTE — DISCHARGE SUMMARY
Orthopaedic Discharge Summary  Dr. ELAINA Luan” Saundra SALGUERO  [phone]    NAME: Amita A Saundra PCP: Edvin Sainz MD   :  MRN: [id number]  [id number] LOS:  ADMIT: 0 days  10/15/2021   AGE/SEX: 61 y.o. female DC:  today             · Admitting Diagnosis: Osteoarthritis of left knee [M17.12]  · Total knee replacement status [Z96.659]    · Surgery Performed: [place] TOTAL KNEE ARTHROPLASTY [25667] (TOTAL KNEE ARTHROPLASTY)    · Discharge Medications:         Discharge Medications      New Medications      Instructions Start Date   aspirin  MG tablet   325 mg, Oral, Daily      oxyCODONE-acetaminophen 5-325 MG per tablet  Commonly known as: PERCOCET   1 tablet, Oral, Every 4 Hours PRN      oxyCODONE-acetaminophen 5-325 MG per tablet  Commonly known as: PERCOCET   1 tablet, Oral, Every 4 Hours PRN         Continue These Medications      Instructions Start Date   acetaminophen 650 MG 8 hr tablet  Commonly known as: TYLENOL   650 mg, Oral, Every 8 Hours PRN, dont take preop      folic acid 1 MG tablet  Commonly known as: FOLVITE   1 mg, Oral, Daily, Last dose 10/10      hydroCHLOROthiazide 25 MG tablet  Commonly known as: HYDRODIURIL   25 mg, Oral, Daily, Do not take dos       indomethacin 50 MG capsule  Commonly known as: INDOCIN   50 mg, Oral, Daily, Stop 1 week prior to surgery      levothyroxine 25 MCG tablet  Commonly known as: SYNTHROID, LEVOTHROID   25 mcg, Oral, Daily, Take dos       methotrexate 2.5 MG tablet   15 mg, Oral, Weekly, Sat  Stop 1 week prior to surgery per Ana Arguello's office      ORENCIA IV   1,000 mg, Intravenous, Every 30 Days, 15th of every month   Last dose sept 15      phentermine 30 MG capsule   30 mg, Oral, Every Morning      predniSONE 5 MG tablet  Commonly known as: DELTASONE   5 mg, Oral, Daily, Take dos       traMADol 50 MG tablet  Commonly known as: ULTRAM   50 mg, Oral, Every 6 Hours PRN, DONT TAKE PREOP      vitamin B-12 500 MCG tablet  Commonly known as: CYANOCOBALAMIN    500 mcg, Oral, Daily, Stop 1 week prior to surgery       vitamin D3 125 MCG (5000 UT) capsule capsule   5,000 Units, Oral, Daily, Stop 1 week prior to surgery              · Vitals:     Vitals:    10/16/21 0854 10/16/21 1144 10/16/21 1638 10/16/21 2105   BP: 116/68 139/68 117/69 130/56   BP Location: Left arm Left arm Left arm Left arm   Patient Position: Lying Lying Lying Sitting   Pulse: 78 82 71 88   Resp: 14 18 16 21   Temp: 99.3 °F (37.4 °C) 98.3 °F (36.8 °C) 98.8 °F (37.1 °C) 99.5 °F (37.5 °C)   TempSrc: Oral Oral Oral Oral   SpO2: 92% 91% 92% 99%   Weight:       Height:           · Labs:      Admission on 10/15/2021   Component Date Value Ref Range Status   • COVID19 10/15/2021 Not Detected  Not Detected - Ref. Range Final   • Glucose 10/15/2021 127* 65 - 99 mg/dL Final   • BUN 10/15/2021 14  8 - 23 mg/dL Final   • Creatinine 10/15/2021 0.75  0.57 - 1.00 mg/dL Final   • Sodium 10/15/2021 135* 136 - 145 mmol/L Final   • Potassium 10/15/2021 3.9  3.5 - 5.2 mmol/L Final   • Chloride 10/15/2021 100  98 - 107 mmol/L Final   • CO2 10/15/2021 23.0  22.0 - 29.0 mmol/L Final   • Calcium 10/15/2021 8.9  8.6 - 10.5 mg/dL Final   • eGFR Non  Amer 10/15/2021 79  >60 mL/min/1.73 Final   • BUN/Creatinine Ratio 10/15/2021 18.7  7.0 - 25.0 Final   • Anion Gap 10/15/2021 12.0  5.0 - 15.0 mmol/L Final   • WBC 10/15/2021 22.20* 3.40 - 10.80 10*3/mm3 Final   • RBC 10/15/2021 4.14  3.77 - 5.28 10*6/mm3 Final   • Hemoglobin 10/15/2021 13.0  12.0 - 15.9 g/dL Final   • Hematocrit 10/15/2021 37.9  34.0 - 46.6 % Final   • MCV 10/15/2021 91.6  79.0 - 97.0 fL Final   • MCH 10/15/2021 31.5  26.6 - 33.0 pg Final   • MCHC 10/15/2021 34.4  31.5 - 35.7 g/dL Final   • RDW 10/15/2021 15.0  12.3 - 15.4 % Final   • RDW-SD 10/15/2021 48.1  37.0 - 54.0 fl Final   • MPV 10/15/2021 7.4  6.0 - 12.0 fL Final   • Platelets 10/15/2021 364  140 - 450 10*3/mm3 Final   • Extra Tube 10/15/2021 Hold for add-ons.   Final    Auto resulted.        No  "results found.    · Hospital Course:   61 y.o. female was admitted to [facility] to services of Pacheco Arguello II, MD with Osteoarthritis of left knee [M17.12]  Total knee replacement status [Z96.659] on 10/15/2021 and underwent [place] TOTAL KNEE ARTHROPLASTY [35881] (TOTAL KNEE ARTHROPLASTY). Post-operatively the patient transferred to the floor where the patient underwent mobilization therapy. Opioids were titrated to achieve appropriate pain management to allow for participation in mobilization exercises. Vital signs and laboratory values are now within safe parameters for discharge. The dressings and/or incision is intact without signs or symptoms of active infection. Operative extremity neurovascular status remains intact as compared to the preoperative exam. Appropriate education re: incision care, activity levels, medications, and follow up visits was completed and all questions were answered. The patient is now deemed stable for discharge.    SNF: The patient had a difficult time mobilizing sufficiently with physical therapy. Further rehabilitation was recommended in a SNF facility. Once a bed was available, and the patient was cleared, there were discharged to the SNF in good condition}.       R \"Zafar\" Saundra SALGUERO MD  Orthopaedic Surgery  [facility]  [phone]                                               "

## 2021-10-17 NOTE — THERAPY TREATMENT NOTE
Subjective: Pt agreeable to therapeutic plan of care.Patient stated she needed to use the bathroom but was already saturated in urine. Stated she didn't sleep well last night.    Objective:     Bed mobility - Mod-A and Max-A  Transfers - Min-A and with rolling walker  Ambulation - 60 feet CGA and with rolling walker    Pain: stated pain less in L knee with meds but was also hurting more in both calves from being in chair yesterday. Added pillows which seemed to get relief.   Education: Provided education on importance of mobility and skilled verbal / tactile cueing throughout intervention.     Assessment: Amita Arguello presents with functional mobility impairments which indicate the need for skilled intervention. Tolerating session today without incident.Pt having more difficulty overall with mobility and range . Performed AAROM LLE while supine and req more assist with getting to EOB. Needed assist with brief and to clean up. Will greatly benefit from rehab at [place]. Will continue to follow and progress as tolerated.     Plan/Recommendations:   Pt would benefit from Inpatient Rehabilitation placement at discharge from facility and requires no DME at discharge.   Pt desires Inpatient Rehabilitation placement at discharge. Pt cooperative; agreeable to therapeutic recommendations and plan of care.     Basic Mobility 6-click:  Rollin = Total, A lot = 2, A little = 3; 4 = None  Supine>Sit:   1 = Total, A lot = 2, A little = 3; 4 = None   Sit>Stand with arms:  1 = Total, A lot = 2, A little = 3; 4 = None  Bed>Chair:   1 = Total, A lot = 2, A little = 3; 4 = None  Ambulate in room:  1 = Total, A lot = 2, A little = 3; 4 = None  3-5 Steps with railin = Total, A lot = 2, A little = 3; 4 = None  Score: 15      Post-Tx Position: Up in Chair and Call light and personal items within reach, pillow under each leg  PPE: gloves, surgical mask, eyewear protection

## 2021-10-17 NOTE — PLAN OF CARE
Goal Outcome Evaluation:  Plan of Care Reviewed With: patient        Progress: improving  Outcome Summary: Awaiting bed at rehab. Referral has been made to [facility] pending acceptance. Will need precert

## 2021-10-18 LAB
BASOPHILS # BLD AUTO: 0.1 10*3/MM3 (ref 0–0.2)
BASOPHILS NFR BLD AUTO: 0.8 % (ref 0–1.5)
DEPRECATED RDW RBC AUTO: 47.7 FL (ref 37–54)
EOSINOPHIL # BLD AUTO: 0.2 10*3/MM3 (ref 0–0.4)
EOSINOPHIL NFR BLD AUTO: 1.4 % (ref 0.3–6.2)
ERYTHROCYTE [DISTWIDTH] IN BLOOD BY AUTOMATED COUNT: 14.8 % (ref 12.3–15.4)
HCT VFR BLD AUTO: 30 % (ref 34–46.6)
HGB BLD-MCNC: 10.4 G/DL (ref 12–15.9)
LYMPHOCYTES # BLD AUTO: 1.4 10*3/MM3 (ref 0.7–3.1)
LYMPHOCYTES NFR BLD AUTO: 11.2 % (ref 19.6–45.3)
MCH RBC QN AUTO: 31.6 PG (ref 26.6–33)
MCHC RBC AUTO-ENTMCNC: 34.6 G/DL (ref 31.5–35.7)
MCV RBC AUTO: 91.4 FL (ref 79–97)
MONOCYTES # BLD AUTO: 0.9 10*3/MM3 (ref 0.1–0.9)
MONOCYTES NFR BLD AUTO: 7.2 % (ref 5–12)
NEUTROPHILS NFR BLD AUTO: 10.2 10*3/MM3 (ref 1.7–7)
NEUTROPHILS NFR BLD AUTO: 79.4 % (ref 42.7–76)
NRBC BLD AUTO-RTO: 0 /100 WBC (ref 0–0.2)
PLATELET # BLD AUTO: 282 10*3/MM3 (ref 140–450)
PMV BLD AUTO: 7.5 FL (ref 6–12)
RBC # BLD AUTO: 3.28 10*6/MM3 (ref 3.77–5.28)
WBC # BLD AUTO: 12.9 10*3/MM3 (ref 3.4–10.8)

## 2021-10-18 PROCEDURE — G0378 HOSPITAL OBSERVATION PER HR: HCPCS

## 2021-10-18 PROCEDURE — 97116 GAIT TRAINING THERAPY: CPT

## 2021-10-18 PROCEDURE — 63710000001 PREDNISONE PER 5 MG: Performed by: ORTHOPAEDIC SURGERY

## 2021-10-18 PROCEDURE — 85025 COMPLETE CBC W/AUTO DIFF WBC: CPT | Performed by: ORTHOPAEDIC SURGERY

## 2021-10-18 PROCEDURE — 97110 THERAPEUTIC EXERCISES: CPT

## 2021-10-18 RX ADMIN — DOCUSATE SODIUM 100 MG: 100 CAPSULE, LIQUID FILLED ORAL at 06:28

## 2021-10-18 RX ADMIN — MELOXICAM 15 MG: 15 TABLET ORAL at 08:28

## 2021-10-18 RX ADMIN — MAGNESIUM HYDROXIDE 10 ML: 2400 SUSPENSION ORAL at 10:15

## 2021-10-18 RX ADMIN — OXYCODONE 10 MG: 5 TABLET ORAL at 06:28

## 2021-10-18 RX ADMIN — HYDROCHLOROTHIAZIDE 25 MG: 25 TABLET ORAL at 08:28

## 2021-10-18 RX ADMIN — PREDNISONE 5 MG: 5 TABLET ORAL at 08:28

## 2021-10-18 RX ADMIN — LEVOTHYROXINE SODIUM 25 MCG: 0.03 TABLET ORAL at 08:28

## 2021-10-18 RX ADMIN — OXYCODONE 10 MG: 5 TABLET ORAL at 20:38

## 2021-10-18 RX ADMIN — ASPIRIN 325 MG: 325 TABLET, COATED ORAL at 08:28

## 2021-10-18 NOTE — PLAN OF CARE
Goal Outcome Evaluation:         Objective:   PM session for exercise.  Focus on quad strength and knee ROM. 10-15 reps each.  Improving knee extension, still struggles with AROM knee flexion ~ 75 degres.

## 2021-10-18 NOTE — PLAN OF CARE
Goal Outcome Evaluation:  Plan of Care Reviewed With: patient        Progress: no change     Pt. VSS this shift, she is awaiting rehab placement. She has complained of some mild pain throughout the night, she ambulates to the bathroom and tolerates well. She has been resting in bed with even, unlabored RR and no signs of distress.

## 2021-10-18 NOTE — THERAPY TREATMENT NOTE
Subjective: Pt agreeable to therapeutic plan of care.    Objective:   PM session for exercise.  Focus on quad strength and knee ROM. 10-15 reps each.  Improving knee extension, still struggles with AROM knee flexion ~ 75 degres.     Pain: 3 VAS  Education: Provided education on importance of mobility and skilled verbal / tactile cueing throughout intervention.     Assessment: Amita A Sweet presents with functional mobility impairments which indicate the need for skilled intervention. Tolerating session today without incident. Will continue to follow and progress as tolerated.     Plan/Recommendations:   Pt would benefit from Inpatient Rehabilitation placement at discharge from facility and requires no DME at discharge.   Pt desires Inpatient Rehabilitation placement at discharge. Pt cooperative; agreeable to therapeutic recommendations and plan of care.       Post-Tx Position: Up in Chair and Call light and personal items within reach  PPE: gloves, surgical mask, eyewear protection

## 2021-10-18 NOTE — THERAPY TREATMENT NOTE
Subjective: Pt agreeable to therapeutic plan of care.    Objective:     Bed mobility - N/A or Not attempted.  Transfers - Modified-Independent stand from chair  Ambulation - 100 feet CGA and with rolling walker    Pain: 2 VAS  Education: Provided education on importance of mobility and skilled verbal / tactile cueing throughout intervention.     Assessment: Amita A Sweet presents with functional mobility impairments which indicate the need for skilled intervention. Performed post TKR exercises with min cues, L knee ROM 5-80 degrees.  Discussed positioning to promote knee extension with up in recliner or in bed.  Progressing well with mobility, improved heel strike and TKE in stance.  Tolerating session today without incident. Will continue to follow and progress as tolerated.     Plan/Recommendations:   Pt would benefit from Inpatient Rehabilitation placement at discharge from facility and requires no DME at discharge.   Pt desires Inpatient Rehabilitation placement at discharge. Pt cooperative; agreeable to therapeutic recommendations and plan of care.       Post-Tx Position: Up in Chair, Staff Present and Call light and personal items within reach  PPE: gloves, surgical mask, eyewear protection

## 2021-10-18 NOTE — CASE MANAGEMENT/SOCIAL WORK
Discharge Planning Assessment  [facility]     Patient Name: Amita Arguello  MRN: [id number]  Today's Date: 10/18/2021    Admit Date: 10/15/2021     Discharge Needs Assessment     Row Name 10/18/21 1411       Living Environment    Lives With child(anna), adult; grandchild(anna)    Current Living Arrangements home/apartment/condo    Primary Care Provided by self    Provides Primary Care For no one    Family Caregiver if Needed child(anna), adult    Able to Return to Prior Arrangements yes       Resource/Environmental Concerns    Resource/Environmental Concerns none    Transportation Concerns car, none       Transition Planning    Patient/Family Anticipates Transition to inpatient rehabilitation facility    Patient/Family Anticipated Services at Transition rehabilitation services       Discharge Needs Assessment    Readmission Within the Last 30 Days no previous admission in last 30 days    Concerns to be Addressed care coordination/care conferences; discharge planning    Anticipated Changes Related to Illness none    Equipment Needed After Discharge none    Provided Post Acute Provider List? N/A               Discharge Plan     Row Name 10/18/21 6078       Plan    Plan DC Plan: Accepted to [facility] pending precert. Precert to start 10/18. No PASRR required.    Plan Comments Contacted [facility] liaison to inquire status of pending referral and requested for precert to be started. Liaison confirmed they are working on it. RN updated.              Continued Care and Services - Admitted Since 10/15/2021     Destination     Service Provider Request Status Selected Services Address Phone Fax Patient Preferred    [facility]  Pending - Request Sent N/A [address] [facility] [place] [address] [phone] --              Expected Discharge Date and Time     Expected Discharge Date Expected Discharge Time    Oct 18, 2021          Demographic Summary     Row Name 10/18/21 1419       General Information     Admission Type observation    Referral Source admission list    Reason for Consult discharge planning    Preferred Language English     Used During This Interaction no       Contact Information    Permission Granted to Share Info With                Functional Status     Row Name 10/18/21 1410       Functional Status    Current Activity Tolerance moderate              Phone communication or documentation only - no physical contact with patient or family.    Raquel Moura RN     Office Phone: [phone]  Office Cell: [phone]

## 2021-10-19 VITALS
HEIGHT: 65 IN | TEMPERATURE: 99.1 F | WEIGHT: 246.91 LBS | SYSTOLIC BLOOD PRESSURE: 113 MMHG | BODY MASS INDEX: 41.14 KG/M2 | DIASTOLIC BLOOD PRESSURE: 65 MMHG | HEART RATE: 75 BPM | OXYGEN SATURATION: 94 % | RESPIRATION RATE: 16 BRPM

## 2021-10-19 LAB
B PARAPERT DNA SPEC QL NAA+PROBE: NOT DETECTED
B PERT DNA SPEC QL NAA+PROBE: NOT DETECTED
C PNEUM DNA NPH QL NAA+NON-PROBE: NOT DETECTED
FLUAV SUBTYP SPEC NAA+PROBE: NOT DETECTED
FLUBV RNA ISLT QL NAA+PROBE: NOT DETECTED
HADV DNA SPEC NAA+PROBE: NOT DETECTED
HCOV 229E RNA SPEC QL NAA+PROBE: NOT DETECTED
HCOV HKU1 RNA SPEC QL NAA+PROBE: NOT DETECTED
HCOV NL63 RNA SPEC QL NAA+PROBE: NOT DETECTED
HCOV OC43 RNA SPEC QL NAA+PROBE: NOT DETECTED
HMPV RNA NPH QL NAA+NON-PROBE: NOT DETECTED
HPIV1 RNA SPEC QL NAA+PROBE: NOT DETECTED
HPIV2 RNA SPEC QL NAA+PROBE: NOT DETECTED
HPIV3 RNA NPH QL NAA+PROBE: NOT DETECTED
HPIV4 P GENE NPH QL NAA+PROBE: NOT DETECTED
M PNEUMO IGG SER IA-ACNC: NOT DETECTED
RHINOVIRUS RNA SPEC NAA+PROBE: NOT DETECTED
RSV RNA NPH QL NAA+NON-PROBE: NOT DETECTED
SARS-COV-2 RNA NPH QL NAA+NON-PROBE: NOT DETECTED

## 2021-10-19 PROCEDURE — G0378 HOSPITAL OBSERVATION PER HR: HCPCS

## 2021-10-19 PROCEDURE — 97110 THERAPEUTIC EXERCISES: CPT

## 2021-10-19 PROCEDURE — 97116 GAIT TRAINING THERAPY: CPT

## 2021-10-19 PROCEDURE — 63710000001 PREDNISONE PER 5 MG: Performed by: ORTHOPAEDIC SURGERY

## 2021-10-19 PROCEDURE — 0202U NFCT DS 22 TRGT SARS-COV-2: CPT | Performed by: ORTHOPAEDIC SURGERY

## 2021-10-19 RX ADMIN — MELOXICAM 15 MG: 15 TABLET ORAL at 09:11

## 2021-10-19 RX ADMIN — OXYCODONE 10 MG: 5 TABLET ORAL at 09:16

## 2021-10-19 RX ADMIN — ASPIRIN 325 MG: 325 TABLET, COATED ORAL at 09:10

## 2021-10-19 RX ADMIN — PREDNISONE 5 MG: 5 TABLET ORAL at 09:11

## 2021-10-19 RX ADMIN — HYDROCHLOROTHIAZIDE 25 MG: 25 TABLET ORAL at 09:10

## 2021-10-19 RX ADMIN — LEVOTHYROXINE SODIUM 25 MCG: 0.03 TABLET ORAL at 09:11

## 2021-10-19 RX ADMIN — OXYCODONE 10 MG: 5 TABLET ORAL at 14:21

## 2021-10-19 NOTE — CASE MANAGEMENT/SOCIAL WORK
Continued Stay Note   [facility]     Patient Name: Amita Arguello  MRN: [id number]  Today's Date: 10/19/2021    Admit Date: 10/15/2021     Discharge Plan     Row Name 10/19/21 0924       Plan    Plan DC Plan: Accepted to [facility]. Precert approved. No PASRR required. Bed available 10/19.    Plan Comments Received notification from [facility] liaison that patient requires a negative COVID test prior to dc. Confirmed that patient’s precert has been approved and she can come today. DC orders noted. [facility] reported they will contact the nurse to notify that they can accept at any time after COVID test. Pharmacy updated to [facility] pharmacy for [facility]. Updated CM via secure chat.              Expected Discharge Date and Time     Expected Discharge Date Expected Discharge Time    Oct 19, 2021         Phone communication or documentation only - no physical contact with patient or family.    Raquel Moura RN     Office Phone: [phone]  Office Cell: [phone]

## 2021-10-19 NOTE — PLAN OF CARE
Goal Outcome Evaluation:           Progress: no change       Patient is alert and oriented with  at bedside. She is anticipating discharge to rehab today. VSS, care plan is on going.

## 2021-10-19 NOTE — CASE MANAGEMENT/SOCIAL WORK
Case Management Discharge Note      Final Note: [facility]      Selected Continued Care - Discharged on 10/19/2021 Admission date: 10/15/2021 - Discharge disposition: Skilled Nursing Facility (DC - External)    Destination Coordination complete.    Service Provider Selected Services Address Phone Fax Patient Preferred    [facility]  Inpatient Rehabilitation [address] [facility] [address] [phone]            Final Discharge Disposition Code: 03 - skilled nursing facility (SNF)

## 2021-10-19 NOTE — PLAN OF CARE
Goal Outcome Evaluation:         Assessment: Amita Arguello presents with functional mobility impairments which indicate the need for skilled intervention. Tolerating session today without incident. Still lacking L knee flexion AROM, working on exercises.  Progressing well with reciprocal gait pattern using RW. Will continue to follow and progress as tolerated.

## 2021-10-19 NOTE — THERAPY TREATMENT NOTE
Subjective: Pt agreeable to therapeutic plan of care.  Plans to d/c to rehab today.     Objective:     Bed mobility - N/A or Not attempted.  Transfers - SBA and with rolling walker  Ambulation - 100 feet CGA and with rolling walker   Seated and reclined LLE exercise with focus on L knee flexion. AROM L knee 5-75 degrees. Removed and replaced ace bandage for improved comfort and fit.      Pain: 2 VAS  Education: Provided education on importance of mobility and skilled verbal / tactile cueing throughout intervention.     Assessment: Amita A Sweet presents with functional mobility impairments which indicate the need for skilled intervention. Tolerating session today without incident. Still lacking L knee flexion AROM, working on exercises.  Progressing well with reciprocal gait pattern using RW. Will continue to follow and progress as tolerated.     Plan/Recommendations:   Pt would benefit from Inpatient Rehabilitation placement at discharge from facility and requires no DME at discharge.   Pt desires Inpatient Rehabilitation placement at discharge. Pt cooperative; agreeable to therapeutic recommendations and plan of care.       Post-Tx Position: Up in Chair and Call light and personal items within reach  PPE: gloves, surgical mask, eyewear protection

## 2021-11-24 ENCOUNTER — TRANSCRIBE ORDERS (OUTPATIENT)
Dept: ADMINISTRATIVE | Facility: HOSPITAL | Age: 61
End: 2021-11-24

## 2021-11-24 ENCOUNTER — LAB (OUTPATIENT)
Dept: LAB | Facility: HOSPITAL | Age: 61
End: 2021-11-24

## 2021-11-24 DIAGNOSIS — M25.562 LEFT KNEE PAIN, UNSPECIFIED CHRONICITY: ICD-10-CM

## 2021-11-24 DIAGNOSIS — M25.562 LEFT KNEE PAIN, UNSPECIFIED CHRONICITY: Primary | ICD-10-CM

## 2021-11-24 LAB
CRP SERPL-MCNC: 1.7 MG/DL (ref 0–0.5)
ERYTHROCYTE [SEDIMENTATION RATE] IN BLOOD: 19 MM/HR (ref 0–30)

## 2021-11-24 PROCEDURE — 85652 RBC SED RATE AUTOMATED: CPT

## 2021-11-24 PROCEDURE — 87070 CULTURE OTHR SPECIMN AEROBIC: CPT

## 2021-11-24 PROCEDURE — 87205 SMEAR GRAM STAIN: CPT

## 2021-11-24 PROCEDURE — 36415 COLL VENOUS BLD VENIPUNCTURE: CPT

## 2021-11-24 PROCEDURE — 87147 CULTURE TYPE IMMUNOLOGIC: CPT

## 2021-11-24 PROCEDURE — 87075 CULTR BACTERIA EXCEPT BLOOD: CPT

## 2021-11-24 PROCEDURE — 87186 SC STD MICRODIL/AGAR DIL: CPT

## 2021-11-24 PROCEDURE — 86140 C-REACTIVE PROTEIN: CPT

## 2021-11-27 LAB
BACTERIA SPEC AEROBE CULT: ABNORMAL
GRAM STN SPEC: ABNORMAL
GRAM STN SPEC: ABNORMAL

## 2021-11-29 LAB — BACTERIA SPEC ANAEROBE CULT: NORMAL

## 2021-12-01 RX ORDER — CLINDAMYCIN HYDROCHLORIDE 150 MG/1
300 CAPSULE ORAL 4 TIMES DAILY
Qty: 80 CAPSULE | Refills: 0 | Status: SHIPPED | OUTPATIENT
Start: 2021-12-01 | End: 2021-12-11

## 2022-01-26 ENCOUNTER — TRANSCRIBE ORDERS (OUTPATIENT)
Dept: ADMINISTRATIVE | Facility: HOSPITAL | Age: 62
End: 2022-01-26

## 2022-01-26 ENCOUNTER — LAB (OUTPATIENT)
Dept: LAB | Facility: HOSPITAL | Age: 62
End: 2022-01-26

## 2022-01-26 DIAGNOSIS — Z48.89 ENCOUNTER FOR POSTOPERATIVE WOUND CARE: ICD-10-CM

## 2022-01-26 DIAGNOSIS — Z96.652 S/P TOTAL KNEE REPLACEMENT, LEFT: Primary | ICD-10-CM

## 2022-01-26 DIAGNOSIS — Z96.652 S/P TOTAL KNEE REPLACEMENT, LEFT: ICD-10-CM

## 2022-01-26 LAB
CRP SERPL-MCNC: 1.55 MG/DL (ref 0–0.5)
ERYTHROCYTE [SEDIMENTATION RATE] IN BLOOD: 11 MM/HR (ref 0–30)

## 2022-01-26 PROCEDURE — 36415 COLL VENOUS BLD VENIPUNCTURE: CPT

## 2022-01-26 PROCEDURE — 85652 RBC SED RATE AUTOMATED: CPT

## 2022-01-26 PROCEDURE — 86140 C-REACTIVE PROTEIN: CPT

## 2022-10-05 ENCOUNTER — OFFICE VISIT (OUTPATIENT)
Dept: CARDIOLOGY | Facility: CLINIC | Age: 62
End: 2022-10-05

## 2022-10-05 VITALS
DIASTOLIC BLOOD PRESSURE: 62 MMHG | HEART RATE: 61 BPM | OXYGEN SATURATION: 95 % | HEIGHT: 65 IN | BODY MASS INDEX: 43.99 KG/M2 | SYSTOLIC BLOOD PRESSURE: 130 MMHG | WEIGHT: 264 LBS

## 2022-10-05 DIAGNOSIS — R60.0 BILATERAL LEG EDEMA: ICD-10-CM

## 2022-10-05 DIAGNOSIS — E78.5 DYSLIPIDEMIA: Primary | ICD-10-CM

## 2022-10-05 DIAGNOSIS — E66.01 MORBID OBESITY WITH BMI OF 40.0-44.9, ADULT: ICD-10-CM

## 2022-10-05 PROCEDURE — 93000 ELECTROCARDIOGRAM COMPLETE: CPT | Performed by: INTERNAL MEDICINE

## 2022-10-05 PROCEDURE — 99214 OFFICE O/P EST MOD 30 MIN: CPT | Performed by: INTERNAL MEDICINE

## 2022-10-05 RX ORDER — HYDROCHLOROTHIAZIDE 25 MG/1
1 TABLET ORAL DAILY
COMMUNITY
Start: 2021-12-02

## 2022-10-05 RX ORDER — PHENTERMINE HYDROCHLORIDE 30 MG/1
30 CAPSULE ORAL EVERY MORNING
COMMUNITY

## 2022-10-05 RX ORDER — INDOMETHACIN 20 MG/1
CAPSULE ORAL
COMMUNITY

## 2022-10-05 RX ORDER — PREDNISONE 1 MG/1
5 TABLET ORAL DAILY
COMMUNITY

## 2022-10-05 RX ORDER — LEVOTHYROXINE SODIUM 0.03 MG/1
1 TABLET ORAL DAILY
COMMUNITY
Start: 2021-12-05

## 2022-10-05 NOTE — PROGRESS NOTES
Subjective:     Encounter Date:10/05/2022      Patient ID: Amita Arguello is a 62 y.o. female.    Chief Complaint : Follow-up for leg edema, obesity with BMI over 30, dyslipidemia    History of Present Illness      Ms. Amita Arguello has PMH of      -Chronic leg edema on hydrochlorothiazide therapy  -Hypothyroidism  -Obesity with BMI over 40  -History of MRSA colonization  -RA  -Left TKR 10/15/2021  -Family history negative for premature CAD, no significant surgical history  -Former smoker quit over 20 years ago    Here for follow-up.  Patient denies any chest pain or shortness of breath.    Patient's arterial blood pressure is 130/62, heart rate 60, O2 sat of 95% on room air..  Patient's BMI is over 43.    Review of records reveal labs from 7/15/2021 revealed normal Chem-7, CBC, PT PTT.  Labs from 11/2/2018 revealed cholesterol 164 triglycerides 163 HDL 39 LDL 92.  Repeat from 4/16/2021 reveal cholesterol 191 triglycerides 182 HDL 49 .  Labs from 1/26/2022 revealed elevated CRP with normal ESR.      Assessment:        Abnormal EKG, Lexiscan Cardiolite negative for ischemia 9/2/21  Chronic leg edema  Obesity with BMI over 42  Dyslipidemia    Recommendations / Plan:        Reviewed EKG results with patient.  Reviewed BMI over 43, counseled on weight loss diet and exercise.   Reviewed low HDL counseled on walking exercise.  Patient had an echocardiogram 9/2/2021 which revealed normal LV systolic function.  Gated images on nuclear 9/2/2021 revealed EF of 44% which is falsely lowered due to diaphragmatic attenuation.  Procedures Lexiscan Cardiolite done 9/2/2021 reviewed/interpreted by me reveals diaphragmatic attenuation, no ischemia and EF of 44%.        ECG 12 Lead    Date/Time: 10/5/2022 8:32 PM  Performed by: Tyler Martin MD  Authorized by: Tyler Martin MD   Comparison: compared with previous ECG from 10/5/2021  Comparison to previous ECG: EKG done today reviewed/interpreted by  me reveals sinus rhythm with rate of 70 bpm with nonspecific ST-T changes, no new change compared EKG from 10/5/2021, except heart rate is faster.              Copied text in this portion of the note has been reviewed and is accurate as of 10/11/2022  The following portions of the patient's history were reviewed and updated as appropriate: allergies, current medications, past family history, past medical history, past social history, past surgical history and problem list.    Assessment:         MDM     Diagnosis Plan   1. Dyslipidemia        2. Bilateral leg edema        3. Morbid obesity with BMI of 40.0-44.9, adult (HCC)               Plan:               Past Medical History:  Past Medical History:   Diagnosis Date   • Arthritis    • Disease of thyroid gland     hypothyroid   • MRSA colonization    • Peripheral edema    • Rheumatoid arthritis (HCC)    • Spinal stenosis      Past Surgical History:  Past Surgical History:   Procedure Laterality Date   • TOTAL KNEE ARTHROPLASTY Left 10/15/2021    Procedure: TOTAL KNEE ARTHROPLASTY;  Surgeon: Pacheco Arguello II, MD;  Location: [facility];  Service: Orthopedics;  Laterality: Left;      Allergies:  No Known Allergies  Home Meds:  Current Meds:     Current Outpatient Medications:   •  hydroCHLOROthiazide (HYDRODIURIL) 25 MG tablet, Take 1 tablet by mouth Daily., Disp: , Rfl:   •  Indomethacin 20 MG capsule, Take  by mouth., Disp: , Rfl:   •  levothyroxine (SYNTHROID, LEVOTHROID) 25 MCG tablet, Take 1 tablet by mouth Daily., Disp: , Rfl:   •  methotrexate 2.5 MG tablet, Take  by mouth 1 (One) Time Per Week., Disp: , Rfl:   •  phentermine 30 MG capsule, Take 30 mg by mouth Every Morning., Disp: , Rfl:   •  predniSONE (DELTASONE) 5 MG tablet, Take 5 mg by mouth Daily., Disp: , Rfl:   •  aspirin  MG tablet, Take 1 tablet by mouth Daily., Disp: 30 tablet, Rfl: 0  Social History:   Social History     Tobacco Use   • Smoking status: Former     Packs/day:  "1.00     Years: 20.00     Pack years: 20.00     Types: Cigarettes   • Smokeless tobacco: Never   Substance Use Topics   • Alcohol use: Not Currently      Family History:  Family History   Problem Relation Age of Onset   • No Known Problems Mother    • No Known Problems Father               Review of Systems   Constitutional: Negative for malaise/fatigue.   Cardiovascular: Positive for leg swelling. Negative for chest pain and palpitations.   Respiratory: Negative for shortness of breath.    Skin: Negative for rash.   Neurological: Negative for dizziness, light-headedness and numbness.     All other systems are negative         Objective:     Physical Exam  /62   Pulse 61   Ht 165.1 cm (65\")   Wt 120 kg (264 lb)   SpO2 95%   BMI 43.93 kg/m²   General:  Appears in no acute distress  Eyes: Sclera is anicteric,  conjunctiva is clear   HEENT:  No JVD.  No carotid bruits  Respiratory: Respirations regular and unlabored at rest.  Clear to auscultation  Cardiovascular: S1,S2 Regular rate and rhythm. No murmur, rub or gallop auscultated.   Extremities: No digital clubbing or cyanosis, no edema  Skin: Color pink. Skin warm and dry to touch. No rashes  No xanthoma  Neuro: Alert and awake.    Lab Reviewed:         Tyler Martin MD  10/11/2022 20:36 EDT      EMR Dragon/Transcription:   \"Dictated utilizing Dragon dictation\".        "

## 2024-03-15 ENCOUNTER — HOSPITAL ENCOUNTER (OUTPATIENT)
Dept: CARDIOLOGY | Facility: HOSPITAL | Age: 64
Discharge: HOME | End: 2024-03-15
Payer: COMMERCIAL

## 2024-03-15 ENCOUNTER — PRE-ADMISSION TESTING (OUTPATIENT)
Dept: PREADMISSION TESTING | Facility: HOSPITAL | Age: 64
End: 2024-03-15
Payer: COMMERCIAL

## 2024-03-15 ENCOUNTER — LAB (OUTPATIENT)
Dept: LAB | Facility: HOSPITAL | Age: 64
End: 2024-03-15
Payer: COMMERCIAL

## 2024-03-15 ENCOUNTER — HOSPITAL ENCOUNTER (OUTPATIENT)
Dept: GENERAL RADIOLOGY | Facility: HOSPITAL | Age: 64
Discharge: HOME | End: 2024-03-15
Payer: COMMERCIAL

## 2024-03-15 VITALS
HEART RATE: 62 BPM | SYSTOLIC BLOOD PRESSURE: 168 MMHG | WEIGHT: 250 LBS | DIASTOLIC BLOOD PRESSURE: 70 MMHG | HEIGHT: 61 IN | OXYGEN SATURATION: 95 % | RESPIRATION RATE: 18 BRPM | TEMPERATURE: 97.6 F | BODY MASS INDEX: 47.2 KG/M2

## 2024-03-15 LAB
ABO GROUP BLD: NORMAL
ALBUMIN SERPL-MCNC: 4.2 G/DL (ref 3.5–5.2)
ALBUMIN/GLOB SERPL: 1.9 G/DL
ALP SERPL-CCNC: 87 U/L (ref 39–117)
ALT SERPL W P-5'-P-CCNC: 12 U/L (ref 1–33)
ANION GAP SERPL CALCULATED.3IONS-SCNC: 10.4 MMOL/L (ref 5–15)
APTT PPP: 27.8 SECONDS (ref 24–31)
AST SERPL-CCNC: 20 U/L (ref 1–32)
BACTERIA UR QL AUTO: ABNORMAL /HPF
BASOPHILS # BLD AUTO: 0.07 10*3/MM3 (ref 0–0.2)
BASOPHILS NFR BLD AUTO: 0.7 % (ref 0–1.5)
BILIRUB SERPL-MCNC: 0.4 MG/DL (ref 0–1.2)
BILIRUB UR QL STRIP: NEGATIVE
BLD GP AB SCN SERPL QL: NEGATIVE
BUN SERPL-MCNC: 15 MG/DL (ref 8–23)
BUN/CREAT SERPL: 21.4 (ref 7–25)
CALCIUM SPEC-SCNC: 9 MG/DL (ref 8.6–10.5)
CHLORIDE SERPL-SCNC: 107 MMOL/L (ref 98–107)
CLARITY UR: ABNORMAL
CO2 SERPL-SCNC: 26.6 MMOL/L (ref 22–29)
COLOR UR: YELLOW
CREAT SERPL-MCNC: 0.7 MG/DL (ref 0.57–1)
DEPRECATED RDW RBC AUTO: 44.4 FL (ref 37–54)
EGFRCR SERPLBLD CKD-EPI 2021: 97.9 ML/MIN/1.73
EOSINOPHIL # BLD AUTO: 0.28 10*3/MM3 (ref 0–0.4)
EOSINOPHIL NFR BLD AUTO: 3 % (ref 0.3–6.2)
ERYTHROCYTE [DISTWIDTH] IN BLOOD BY AUTOMATED COUNT: 13.6 % (ref 12.3–15.4)
GLOBULIN UR ELPH-MCNC: 2.2 GM/DL
GLUCOSE SERPL-MCNC: 101 MG/DL (ref 65–99)
GLUCOSE UR STRIP-MCNC: NEGATIVE MG/DL
HCT VFR BLD AUTO: 38.3 % (ref 34–46.6)
HGB BLD-MCNC: 12.8 G/DL (ref 12–15.9)
HGB UR QL STRIP.AUTO: NEGATIVE
HOLD SPECIMEN: NORMAL
HYALINE CASTS UR QL AUTO: ABNORMAL /LPF
IMM GRANULOCYTES # BLD AUTO: 0.04 10*3/MM3 (ref 0–0.05)
IMM GRANULOCYTES NFR BLD AUTO: 0.4 % (ref 0–0.5)
INR PPP: 0.98 (ref 0.93–1.1)
KETONES UR QL STRIP: NEGATIVE
LEUKOCYTE ESTERASE UR QL STRIP.AUTO: ABNORMAL
LYMPHOCYTES # BLD AUTO: 1.38 10*3/MM3 (ref 0.7–3.1)
LYMPHOCYTES NFR BLD AUTO: 14.6 % (ref 19.6–45.3)
MCH RBC QN AUTO: 30.6 PG (ref 26.6–33)
MCHC RBC AUTO-ENTMCNC: 33.4 G/DL (ref 31.5–35.7)
MCV RBC AUTO: 91.6 FL (ref 79–97)
MONOCYTES # BLD AUTO: 0.66 10*3/MM3 (ref 0.1–0.9)
MONOCYTES NFR BLD AUTO: 7 % (ref 5–12)
MRSA DNA SPEC QL NAA+PROBE: NORMAL
NEUTROPHILS NFR BLD AUTO: 7 10*3/MM3 (ref 1.7–7)
NEUTROPHILS NFR BLD AUTO: 74.3 % (ref 42.7–76)
NITRITE UR QL STRIP: NEGATIVE
NRBC BLD AUTO-RTO: 0 /100 WBC (ref 0–0.2)
PH UR STRIP.AUTO: 6.5 [PH] (ref 5–8)
PLATELET # BLD AUTO: 372 10*3/MM3 (ref 140–450)
PMV BLD AUTO: 10.3 FL (ref 6–12)
POTASSIUM SERPL-SCNC: 3.4 MMOL/L (ref 3.5–5.2)
PROT SERPL-MCNC: 6.4 G/DL (ref 6–8.5)
PROT UR QL STRIP: ABNORMAL
PROTHROMBIN TIME: 10.7 SECONDS (ref 9.6–11.7)
QT INTERVAL: 460 MS
QTC INTERVAL: 455 MS
RBC # BLD AUTO: 4.18 10*6/MM3 (ref 3.77–5.28)
RBC # UR STRIP: ABNORMAL /HPF
REF LAB TEST METHOD: ABNORMAL
RH BLD: POSITIVE
SODIUM SERPL-SCNC: 144 MMOL/L (ref 136–145)
SP GR UR STRIP: >=1.03 (ref 1–1.03)
SQUAMOUS #/AREA URNS HPF: ABNORMAL /HPF
T&S EXPIRATION DATE: NORMAL
UROBILINOGEN UR QL STRIP: ABNORMAL
WBC # UR STRIP: ABNORMAL /HPF
WBC NRBC COR # BLD AUTO: 9.43 10*3/MM3 (ref 3.4–10.8)

## 2024-03-15 PROCEDURE — 85610 PROTHROMBIN TIME: CPT | Performed by: ORTHOPAEDIC SURGERY

## 2024-03-15 PROCEDURE — 85025 COMPLETE CBC W/AUTO DIFF WBC: CPT | Performed by: ORTHOPAEDIC SURGERY

## 2024-03-15 PROCEDURE — 86901 BLOOD TYPING SEROLOGIC RH(D): CPT | Performed by: ORTHOPAEDIC SURGERY

## 2024-03-15 PROCEDURE — 71046 X-RAY EXAM CHEST 2 VIEWS: CPT

## 2024-03-15 PROCEDURE — 85730 THROMBOPLASTIN TIME PARTIAL: CPT | Performed by: ORTHOPAEDIC SURGERY

## 2024-03-15 PROCEDURE — 36415 COLL VENOUS BLD VENIPUNCTURE: CPT | Performed by: ORTHOPAEDIC SURGERY

## 2024-03-15 PROCEDURE — 81001 URINALYSIS AUTO W/SCOPE: CPT | Performed by: ORTHOPAEDIC SURGERY

## 2024-03-15 PROCEDURE — 86850 RBC ANTIBODY SCREEN: CPT | Performed by: ORTHOPAEDIC SURGERY

## 2024-03-15 PROCEDURE — 93005 ELECTROCARDIOGRAM TRACING: CPT | Performed by: ORTHOPAEDIC SURGERY

## 2024-03-15 PROCEDURE — 86900 BLOOD TYPING SEROLOGIC ABO: CPT | Performed by: ORTHOPAEDIC SURGERY

## 2024-03-15 PROCEDURE — 80053 COMPREHEN METABOLIC PANEL: CPT | Performed by: ORTHOPAEDIC SURGERY

## 2024-03-15 PROCEDURE — 87641 MR-STAPH DNA AMP PROBE: CPT

## 2024-03-15 RX ORDER — ESOMEPRAZOLE MAGNESIUM 40 MG/1
40 CAPSULE, DELAYED RELEASE ORAL DAILY
COMMUNITY
Start: 2023-12-18

## 2024-03-15 RX ORDER — DULOXETIN HYDROCHLORIDE 30 MG/1
1 CAPSULE, DELAYED RELEASE ORAL 2 TIMES DAILY
COMMUNITY
Start: 2024-01-08

## 2024-03-15 RX ORDER — POTASSIUM CHLORIDE 750 MG/1
10 CAPSULE, EXTENDED RELEASE ORAL DAILY
COMMUNITY
Start: 2023-11-08 | End: 2024-11-07

## 2024-03-15 RX ORDER — INDOMETHACIN 50 MG/1
1 CAPSULE ORAL 3 TIMES DAILY PRN
COMMUNITY
Start: 2024-02-12

## 2024-03-15 RX ORDER — PHENTERMINE HYDROCHLORIDE 37.5 MG/1
37.5 CAPSULE ORAL DAILY
COMMUNITY
Start: 2024-03-06

## 2024-03-15 RX ORDER — MULTIVIT-MIN/IRON/FOLIC ACID/K 18-600-40
3 CAPSULE ORAL DAILY
COMMUNITY

## 2024-03-15 NOTE — DISCHARGE INSTRUCTIONS
Pre-op, CHG x 4- (MRSA HX), NPO, and ERAS hydration instructions given        Total Knee  Discharge Instructions  Dr. ELAINA Luna” [facility]  [phone]    INCISION CARE  Wash your hands prior to dressing changes  CHITO Wound VAC: Postoperatively you had a CHITO Wound Vac placed on the incision. This was placed under sterile conditions in the operating room. It remains in place for 7 days postoperatively. After 7 days, the entire dressing must be removed, including all of the sticky adhesive. The dressing and battery pack provide gentle suction to the incision and provide several benefits over a traditional dressing:  It maintains the sterile environment of the OR and reduces the risk of infection  The suction removes unwanted buildup of blood/hematoma under the skin to reduce swelling  The suction also promotes fresh blood supply to the skin and soft tissue to speed up healing  The postoperative scar is reduced in size  Showering is permitted immediately after surgery, but the battery pack must be protected or removed during the shower.   After 7 days the CHITO Wound Vac is removed. If there is no drainage, no dressing is required. If there is some scant drainage a dry bandage can be applied and changed daily until seen in the office or until the drainage stops.   No creams or ointments to the incisions until 4 weeks post op.  Do not touch or pick at the incision  Check incision every day and notify surgeon immediately if any of the following signs or symptoms are seen:  Increase in redness  Increase in swelling around the incision and of the entire extremity  Increase in pain  NEW drainage or oozing from the incision  Pulling apart of the edges of the incision  Increase in overall body temperature (greater than 100.4 degrees)  Zip-Line: your incision was closed with a state of the art device.   Is a non-invasive and easy to use wound closure device that replaces sutures, staples and glue for surgical incisions  It  minimizes scarring and eliminating “railroad” marks that come with staples or sutures  It makes removal as atraumatic as peeling off a bandage  Can be removed at home or by a physical therapist or nurse at 14 days postoperatively    ACTIVITIES  Exercises:  Physical therapy will begin immediately while in the hospital. Patients going to a nursing home will get therapy as part of their care at the SNU/SNF facility. Patients going home may also have a therapist come to the house to help them mobilize until they can safely get to an outpatient therapy facility.  Elevate the affected leg most of the day during the first week post operatively. Caution must be taken to avoid pillow placement directly under the heel of the leg, as this can cause pressure ulcers even with a soft pillow. All pillows and blankets should be placed underneath of the thigh and calf so that the heel is free-floating.  Use cold packs for 20-30 minutes approximately 5 times per day.  You should perform the daily stretching and strengthening exercises as taught by the therapist as often as possible. This can be done many times a day.  Full weight bearing is allowed after surgery. It will be sore/painful to put weight on the leg, but this will help the bone to heal and prevent complications such as pneumonia, bed sores and blood clots. Mobilization is vital to the recovery process.  Activities of Daily Living:  No tub baths, hot tubs, or swimming pools for 4 weeks.  May shower and let water run over the incision immediately after surgery. The battery pack of the CHITO Wound Vac must be protected or removed while in the shower. After the CHITO is removed 7 days after surgery showering is permitted as long as there is no drainage from the wound.     Restrictions  Weight: It is ok to allow full weight bearing after surgery. Weight on the leg actually quickens the recovery process. While it will be sore/painful to put weight on the leg, it is safe to do so.  Hip replacement after hip fracture has a much slower recover process. It can take months to heal fully from a hip fracture and patients even make some slow benefits up to a year afterwards.   Driving: Many patients have questions about when it is safe to return to driving. The answer is that this is extremely variable. It depends on the extent of the surgery, as well as how quickly you heal. Certainly left leg surgeries make returning to driving easier while right leg surgeries require more extensive rehabilitation before driving can be safe. Until you can press down on the brake hard, and are off of all narcotics, driving is not permitted. Your surgeon cannot “clear” you to return to driving, only you can make the decision when you feel it is safe.    Medications  Anticoagulants: After upper extremity surgery most patients do not require an anticoagulant unless you have another injury that will be keeping you from mobilizing. Lower extremity surgery typically does require use of an anticoagulation medicine.   IF YOU HAD LOWER EXTREMITY SURGERY AND ARE NOT DISCHARGED HOME WITH ANY ANTICOAGULANT MEDICINE YOU SHOULD TAKE ASPIRIN 325mg DAILY FOR 30 DAYS POSTOPERATIVELY.  If you are discharged home with an anticoagulant such as Aspirin, Xarelto, Eliquis, Coumadin, or Lovenox, follow these simple instructions:   Notify surgeon immediately if any adolph bleeding is noted in the urine, stool, emesis, or from the nose or the incision. Blood in the stool will often appear as black rather than red. Blood in urine may appear as pink. Blood in emesis may appear as brown/black like coffee grounds.  You will need to apply pressure for longer periods of time to any cuts or abrasions to stop bleeding  Avoid alcohol while taking anticoagulants  Most anticoagulants are to be taken for 30 days postoperatively. After this time, you may stop using them unless instructed otherwise.   If you were already taking an anticoagulant (commonly  Aspirin, Coumadin, or Plavix) you will likely be resuming your normal dose postoperatively and will be continuing that medication at the discretion of the prescribing physician.  Stool Softeners: You will be at greater risk of constipation after surgery due to being less mobile and the pain medications.  Take stool softeners as needed. Over the counter Colace 100 mg 1-2 capsules twice daily can be taken.  If stools become too loose or too frequent, please decreases the dosage or stop the stool softener.  If constipation occurs despite use of stool softeners, you are to continue the stool softeners and add a laxative (Milk of Magnesia 1 ounce daily as needed)  Drink plenty of fluids, and eat fruits and vegetables during your recovery time. Getting up and mobilizing will help the bowels to recover their regular function, as will weaning off of all narcotics when the pain becomes tolerable.  Pain Medications: Utilized after surgery are narcotics. This is some general information about these medications.  CLASSIFICATION: Pain medications are called Opioids and are narcotics  LEGALITIES: It is illegal to share narcotics with others  DRIVING: it is illegal to drive while under the influence of narcotics. Doing so is a DUI.  POTENTIAL SIDE EFFECTS: nausea, vomiting, itching, dizziness, drowsiness, dry mouth, constipation, and difficulty urinating.  POTENTIAL ADVERSE EFFECTS:  Opioid tolerance can develop with use of pain medications and this simply means that it requires more and more of the medication to control pain. However, this is seen more in patients that use opioids for longer periods of time.  Opioid dependence can develop with use of Opioids. People with opioid dependence will experience withdrawal symptoms upon cessation of the medication.  Opioid addiction can develop with use of Opioids. The incidence of this is very unlikely in patients who take the medications as ordered and stop the medications as  instructed.  Opioid overdose can be dangerous, but is unlikely when the medication is taken as ordered and stopped when ordered. It is important not to mix opioids with alcohol as this can lead to over sedation and respiratory difficulty.  DOSAGE:  After the initial surgical pain begins to resolve, you may begin to decrease the pain medication. By the end of a few weeks, you should be off of pain medications.  Refills will not be given by the office during evening hours, on weekends, or after 6 weeks post-op. You are responsible for weaning off of pain medication. You can increase the time between narcotic pills, taking one every 4 then 6 then 8 hours and so on.  To seek refills on pain medications during the initial 6-week post-operative period, you must call the office to request the refill. The office will then notify you when to  the prescription. DO NOT wait until you are out of the medication to request a refill. Prescriptions will not be filled over the weekend and depending on the schedule, it may take a couple days for the prescription to be available. Someone will have to pick the prescription in person at the office.    FOLLOW-UP VISITS  You will need to follow up in the office with your surgeon in 3 weeks, or as instructed elsewhere in your discharge paperwork. Please call this number [phone] to schedule this appointment. If you are going to an SNF/SNU facility, they will arrange for you to follow up in the office.  If you have any concerns or suspected complications prior to your follow up visit, please call the office. Do not wait until your appointment time if you suspect complications. These will need to be addressed in the office promptly.      Pacheco Arguello II, MD  Orthopaedic Surgery  [facility]

## 2024-03-19 LAB
QT INTERVAL: 460 MS
QTC INTERVAL: 455 MS

## 2024-03-21 ENCOUNTER — HOSPITAL ENCOUNTER (OUTPATIENT)
Facility: HOSPITAL | Age: 64
Discharge: HOME | End: 2024-03-22
Attending: ORTHOPAEDIC SURGERY | Admitting: ORTHOPAEDIC SURGERY
Payer: COMMERCIAL

## 2024-03-21 ENCOUNTER — APPOINTMENT (OUTPATIENT)
Dept: GENERAL RADIOLOGY | Facility: HOSPITAL | Age: 64
End: 2024-03-21
Payer: COMMERCIAL

## 2024-03-21 ENCOUNTER — ANESTHESIA EVENT (OUTPATIENT)
Dept: PERIOP | Facility: HOSPITAL | Age: 64
End: 2024-03-21
Payer: COMMERCIAL

## 2024-03-21 ENCOUNTER — ANESTHESIA (OUTPATIENT)
Dept: PERIOP | Facility: HOSPITAL | Age: 64
End: 2024-03-21
Payer: COMMERCIAL

## 2024-03-21 PROBLEM — M17.11 OSTEOARTHRITIS OF RIGHT KNEE: Status: ACTIVE | Noted: 2024-03-21

## 2024-03-21 PROBLEM — Z96.659 STATUS POST KNEE REPLACEMENT: Status: ACTIVE | Noted: 2024-03-21

## 2024-03-21 LAB
HBA1C MFR BLD: 5.2 % (ref 4.8–5.6)
POTASSIUM SERPL-SCNC: 3.2 MMOL/L (ref 3.5–5.2)

## 2024-03-21 PROCEDURE — 97530 THERAPEUTIC ACTIVITIES: CPT

## 2024-03-21 PROCEDURE — 25010000002 HYDROMORPHONE 1 MG/ML SOLUTION: Performed by: NURSE ANESTHETIST, CERTIFIED REGISTERED

## 2024-03-21 PROCEDURE — 25010000002 CEFAZOLIN PER 500 MG: Performed by: ORTHOPAEDIC SURGERY

## 2024-03-21 PROCEDURE — 83036 HEMOGLOBIN GLYCOSYLATED A1C: CPT | Performed by: ORTHOPAEDIC SURGERY

## 2024-03-21 PROCEDURE — 25010000002 FENTANYL CITRATE (PF) 50 MCG/ML SOLUTION: Performed by: ANESTHESIOLOGY

## 2024-03-21 PROCEDURE — C1776 JOINT DEVICE (IMPLANTABLE): HCPCS | Performed by: ORTHOPAEDIC SURGERY

## 2024-03-21 PROCEDURE — 25010000002 FENTANYL CITRATE (PF) 100 MCG/2ML SOLUTION: Performed by: NURSE ANESTHETIST, CERTIFIED REGISTERED

## 2024-03-21 PROCEDURE — C1713 ANCHOR/SCREW BN/BN,TIS/BN: HCPCS | Performed by: ORTHOPAEDIC SURGERY

## 2024-03-21 PROCEDURE — 25010000002 KETOROLAC TROMETHAMINE PER 15 MG: Performed by: ORTHOPAEDIC SURGERY

## 2024-03-21 PROCEDURE — 25810000003 SODIUM CHLORIDE 0.9 % SOLUTION: Performed by: ORTHOPAEDIC SURGERY

## 2024-03-21 PROCEDURE — 25010000002 VANCOMYCIN HCL 1.25 G RECONSTITUTED SOLUTION 1 EACH VIAL: Performed by: ORTHOPAEDIC SURGERY

## 2024-03-21 PROCEDURE — 73560 X-RAY EXAM OF KNEE 1 OR 2: CPT

## 2024-03-21 PROCEDURE — 97110 THERAPEUTIC EXERCISES: CPT

## 2024-03-21 PROCEDURE — 25810000003 SODIUM CHLORIDE 0.9 % SOLUTION 250 ML FLEX CONT: Performed by: ORTHOPAEDIC SURGERY

## 2024-03-21 PROCEDURE — 25010000002 DEXAMETHASONE PER 1 MG: Performed by: ANESTHESIOLOGY

## 2024-03-21 PROCEDURE — G0378 HOSPITAL OBSERVATION PER HR: HCPCS

## 2024-03-21 PROCEDURE — 25010000002 PROPOFOL 200 MG/20ML EMULSION: Performed by: NURSE ANESTHETIST, CERTIFIED REGISTERED

## 2024-03-21 PROCEDURE — 25810000003 LACTATED RINGERS PER 1000 ML: Performed by: ANESTHESIOLOGY

## 2024-03-21 PROCEDURE — 27447 TOTAL KNEE ARTHROPLASTY: CPT

## 2024-03-21 PROCEDURE — 25010000002 MIDAZOLAM PER 1 MG: Performed by: ANESTHESIOLOGY

## 2024-03-21 PROCEDURE — 25010000002 PROPOFOL 1000 MG/100ML EMULSION: Performed by: NURSE ANESTHETIST, CERTIFIED REGISTERED

## 2024-03-21 PROCEDURE — 97535 SELF CARE MNGMENT TRAINING: CPT

## 2024-03-21 PROCEDURE — 25010000002 ONDANSETRON PER 1 MG: Performed by: NURSE ANESTHETIST, CERTIFIED REGISTERED

## 2024-03-21 PROCEDURE — 25010000002 CLONIDINE PER 1 MG: Performed by: ORTHOPAEDIC SURGERY

## 2024-03-21 PROCEDURE — 25010000002 EPINEPHRINE 1 MG/ML SOLUTION: Performed by: ORTHOPAEDIC SURGERY

## 2024-03-21 PROCEDURE — 84132 ASSAY OF SERUM POTASSIUM: CPT | Performed by: ANESTHESIOLOGY

## 2024-03-21 PROCEDURE — 25810000003 LACTATED RINGERS PER 1000 ML: Performed by: NURSE ANESTHETIST, CERTIFIED REGISTERED

## 2024-03-21 PROCEDURE — 25010000002 ROPIVACAINE PER 1 MG: Performed by: ANESTHESIOLOGY

## 2024-03-21 PROCEDURE — 25010000002 ROPIVACAINE PER 1 MG: Performed by: ORTHOPAEDIC SURGERY

## 2024-03-21 PROCEDURE — 25010000002 VANCOMYCIN 1 G RECONSTITUTED SOLUTION: Performed by: ORTHOPAEDIC SURGERY

## 2024-03-21 PROCEDURE — 25010000002 GLYCOPYRROLATE 0.2 MG/ML SOLUTION: Performed by: NURSE ANESTHETIST, CERTIFIED REGISTERED

## 2024-03-21 DEVICE — JOURNEY TIBIAL BASEPLATE NONPOROUS                                    RIGHT SIZE 3
Type: IMPLANTABLE DEVICE | Site: KNEE | Status: FUNCTIONAL
Brand: JOURNEY

## 2024-03-21 DEVICE — GENESIS II LONG STEM 10MM X 100MM
Type: IMPLANTABLE DEVICE | Site: KNEE | Status: FUNCTIONAL
Brand: GENESIS II

## 2024-03-21 DEVICE — JOURNEY II BCS FEMORAL OXINIUM                                    RIGHT SIZE 4
Type: IMPLANTABLE DEVICE | Site: KNEE | Status: FUNCTIONAL
Brand: JOURNEY

## 2024-03-21 DEVICE — JOURNEY II BCS XLPE ARTICULAR                                    INSERT SZ 3-4 RIGHT 13MM
Type: IMPLANTABLE DEVICE | Site: KNEE | Status: FUNCTIONAL
Brand: JOURNEY

## 2024-03-21 DEVICE — CMT BONE PALACOS R HI/VISC 1X40: Type: IMPLANTABLE DEVICE | Site: KNEE | Status: FUNCTIONAL

## 2024-03-21 DEVICE — DEV CONTRL TISS STRATAFIX SPIRAL MNCRYL UD 3/0 PLS 30CM: Type: IMPLANTABLE DEVICE | Site: KNEE | Status: FUNCTIONAL

## 2024-03-21 DEVICE — IMPLANTABLE DEVICE: Type: IMPLANTABLE DEVICE | Site: KNEE | Status: FUNCTIONAL

## 2024-03-21 DEVICE — JOURNEY 7.5 ROUND RESURF PAT 35MM STANDARD
Type: IMPLANTABLE DEVICE | Site: KNEE | Status: FUNCTIONAL
Brand: JOURNEY

## 2024-03-21 DEVICE — DEV WND/CLS CONTRL TISS STRATAFIX SYMM PDS PLS CTX 60CM VIL: Type: IMPLANTABLE DEVICE | Site: KNEE | Status: FUNCTIONAL

## 2024-03-21 RX ORDER — OXYCODONE HYDROCHLORIDE 5 MG/1
10 TABLET ORAL EVERY 4 HOURS PRN
Status: DISCONTINUED | OUTPATIENT
Start: 2024-03-21 | End: 2024-03-21 | Stop reason: HOSPADM

## 2024-03-21 RX ORDER — DEXAMETHASONE SODIUM PHOSPHATE 4 MG/ML
INJECTION, SOLUTION INTRA-ARTICULAR; INTRALESIONAL; INTRAMUSCULAR; INTRAVENOUS; SOFT TISSUE AS NEEDED
Status: DISCONTINUED | OUTPATIENT
Start: 2024-03-21 | End: 2024-03-21 | Stop reason: SURG

## 2024-03-21 RX ORDER — DIPHENHYDRAMINE HYDROCHLORIDE 50 MG/ML
12.5 INJECTION INTRAMUSCULAR; INTRAVENOUS ONCE AS NEEDED
Status: DISCONTINUED | OUTPATIENT
Start: 2024-03-21 | End: 2024-03-21 | Stop reason: HOSPADM

## 2024-03-21 RX ORDER — TRANEXAMIC ACID 10 MG/ML
1000 INJECTION, SOLUTION INTRAVENOUS ONCE
Status: COMPLETED | OUTPATIENT
Start: 2024-03-21 | End: 2024-03-21

## 2024-03-21 RX ORDER — OXYCODONE HCL 10 MG/1
10 TABLET, FILM COATED, EXTENDED RELEASE ORAL ONCE
Status: COMPLETED | OUTPATIENT
Start: 2024-03-21 | End: 2024-03-21

## 2024-03-21 RX ORDER — HYDROCHLOROTHIAZIDE 25 MG/1
25 TABLET ORAL DAILY
Status: DISCONTINUED | OUTPATIENT
Start: 2024-03-21 | End: 2024-03-22 | Stop reason: HOSPADM

## 2024-03-21 RX ORDER — LIDOCAINE HYDROCHLORIDE 10 MG/ML
0.5 INJECTION, SOLUTION INFILTRATION; PERINEURAL ONCE AS NEEDED
Status: DISCONTINUED | OUTPATIENT
Start: 2024-03-21 | End: 2024-03-21 | Stop reason: HOSPADM

## 2024-03-21 RX ORDER — SODIUM CHLORIDE, SODIUM LACTATE, POTASSIUM CHLORIDE, CALCIUM CHLORIDE 600; 310; 30; 20 MG/100ML; MG/100ML; MG/100ML; MG/100ML
INJECTION, SOLUTION INTRAVENOUS CONTINUOUS PRN
Status: DISCONTINUED | OUTPATIENT
Start: 2024-03-21 | End: 2024-03-21 | Stop reason: SURG

## 2024-03-21 RX ORDER — HYDRALAZINE HYDROCHLORIDE 20 MG/ML
5 INJECTION INTRAMUSCULAR; INTRAVENOUS
Status: DISCONTINUED | OUTPATIENT
Start: 2024-03-21 | End: 2024-03-21 | Stop reason: HOSPADM

## 2024-03-21 RX ORDER — ONDANSETRON 2 MG/ML
INJECTION INTRAMUSCULAR; INTRAVENOUS AS NEEDED
Status: DISCONTINUED | OUTPATIENT
Start: 2024-03-21 | End: 2024-03-21 | Stop reason: SURG

## 2024-03-21 RX ORDER — IPRATROPIUM BROMIDE AND ALBUTEROL SULFATE 2.5; .5 MG/3ML; MG/3ML
3 SOLUTION RESPIRATORY (INHALATION) ONCE AS NEEDED
Status: DISCONTINUED | OUTPATIENT
Start: 2024-03-21 | End: 2024-03-21 | Stop reason: HOSPADM

## 2024-03-21 RX ORDER — OXYCODONE HYDROCHLORIDE 5 MG/1
5 TABLET ORAL ONCE AS NEEDED
Status: DISCONTINUED | OUTPATIENT
Start: 2024-03-21 | End: 2024-03-21 | Stop reason: HOSPADM

## 2024-03-21 RX ORDER — FENTANYL CITRATE 50 UG/ML
INJECTION, SOLUTION INTRAMUSCULAR; INTRAVENOUS
Status: COMPLETED | OUTPATIENT
Start: 2024-03-21 | End: 2024-03-21

## 2024-03-21 RX ORDER — MELOXICAM 15 MG/1
15 TABLET ORAL
Status: DISCONTINUED | OUTPATIENT
Start: 2024-03-22 | End: 2024-03-22 | Stop reason: HOSPADM

## 2024-03-21 RX ORDER — GABAPENTIN 300 MG/1
300 CAPSULE ORAL ONCE
Status: COMPLETED | OUTPATIENT
Start: 2024-03-21 | End: 2024-03-21

## 2024-03-21 RX ORDER — SODIUM CHLORIDE 9 MG/ML
9 INJECTION, SOLUTION INTRAVENOUS CONTINUOUS PRN
Status: DISCONTINUED | OUTPATIENT
Start: 2024-03-21 | End: 2024-03-21 | Stop reason: HOSPADM

## 2024-03-21 RX ORDER — GLYCOPYRROLATE 0.2 MG/ML
INJECTION INTRAMUSCULAR; INTRAVENOUS AS NEEDED
Status: DISCONTINUED | OUTPATIENT
Start: 2024-03-21 | End: 2024-03-21 | Stop reason: SURG

## 2024-03-21 RX ORDER — ROPIVACAINE HYDROCHLORIDE 5 MG/ML
INJECTION, SOLUTION EPIDURAL; INFILTRATION; PERINEURAL
Status: COMPLETED | OUTPATIENT
Start: 2024-03-21 | End: 2024-03-21

## 2024-03-21 RX ORDER — FENTANYL CITRATE 50 UG/ML
INJECTION, SOLUTION INTRAMUSCULAR; INTRAVENOUS AS NEEDED
Status: DISCONTINUED | OUTPATIENT
Start: 2024-03-21 | End: 2024-03-21 | Stop reason: SURG

## 2024-03-21 RX ORDER — NALOXONE HCL 0.4 MG/ML
0.1 VIAL (ML) INJECTION
Status: DISCONTINUED | OUTPATIENT
Start: 2024-03-21 | End: 2024-03-22 | Stop reason: HOSPADM

## 2024-03-21 RX ORDER — LABETALOL HYDROCHLORIDE 5 MG/ML
5 INJECTION, SOLUTION INTRAVENOUS
Status: DISCONTINUED | OUTPATIENT
Start: 2024-03-21 | End: 2024-03-21 | Stop reason: HOSPADM

## 2024-03-21 RX ORDER — VANCOMYCIN HYDROCHLORIDE 1 G/20ML
INJECTION, POWDER, LYOPHILIZED, FOR SOLUTION INTRAVENOUS AS NEEDED
Status: DISCONTINUED | OUTPATIENT
Start: 2024-03-21 | End: 2024-03-21 | Stop reason: HOSPADM

## 2024-03-21 RX ORDER — ONDANSETRON 4 MG/1
4 TABLET, FILM COATED ORAL EVERY 6 HOURS PRN
Qty: 30 TABLET | Refills: 0 | Status: SHIPPED | OUTPATIENT
Start: 2024-03-21

## 2024-03-21 RX ORDER — ACETAMINOPHEN 500 MG
1000 TABLET ORAL ONCE
Status: COMPLETED | OUTPATIENT
Start: 2024-03-21 | End: 2024-03-21

## 2024-03-21 RX ORDER — PROPOFOL 10 MG/ML
INJECTION, EMULSION INTRAVENOUS CONTINUOUS PRN
Status: DISCONTINUED | OUTPATIENT
Start: 2024-03-21 | End: 2024-03-21 | Stop reason: SURG

## 2024-03-21 RX ORDER — TRANEXAMIC ACID 100 MG/ML
INJECTION, SOLUTION INTRAVENOUS AS NEEDED
Status: DISCONTINUED | OUTPATIENT
Start: 2024-03-21 | End: 2024-03-21 | Stop reason: HOSPADM

## 2024-03-21 RX ORDER — OXYCODONE AND ACETAMINOPHEN 5; 325 MG/1; MG/1
1 TABLET ORAL EVERY 4 HOURS PRN
Qty: 50 TABLET | Refills: 0 | Status: SHIPPED | OUTPATIENT
Start: 2024-03-21

## 2024-03-21 RX ORDER — CELECOXIB 200 MG/1
200 CAPSULE ORAL ONCE
Status: COMPLETED | OUTPATIENT
Start: 2024-03-21 | End: 2024-03-21

## 2024-03-21 RX ORDER — OXYCODONE HYDROCHLORIDE 5 MG/1
5 TABLET ORAL EVERY 4 HOURS PRN
Status: DISCONTINUED | OUTPATIENT
Start: 2024-03-21 | End: 2024-03-22 | Stop reason: HOSPADM

## 2024-03-21 RX ORDER — NALOXONE HCL 0.4 MG/ML
0.4 VIAL (ML) INJECTION AS NEEDED
Status: DISCONTINUED | OUTPATIENT
Start: 2024-03-21 | End: 2024-03-21 | Stop reason: HOSPADM

## 2024-03-21 RX ORDER — MIDAZOLAM HYDROCHLORIDE 1 MG/ML
1 INJECTION INTRAMUSCULAR; INTRAVENOUS
Status: DISCONTINUED | OUTPATIENT
Start: 2024-03-21 | End: 2024-03-21 | Stop reason: HOSPADM

## 2024-03-21 RX ORDER — ACETAMINOPHEN 500 MG
1000 TABLET ORAL EVERY 6 HOURS PRN
COMMUNITY

## 2024-03-21 RX ORDER — PHENYLEPHRINE HCL IN 0.9% NACL 1 MG/10 ML
SYRINGE (ML) INTRAVENOUS AS NEEDED
Status: DISCONTINUED | OUTPATIENT
Start: 2024-03-21 | End: 2024-03-21 | Stop reason: SURG

## 2024-03-21 RX ORDER — ONDANSETRON 2 MG/ML
4 INJECTION INTRAMUSCULAR; INTRAVENOUS ONCE AS NEEDED
Status: DISCONTINUED | OUTPATIENT
Start: 2024-03-21 | End: 2024-03-21 | Stop reason: HOSPADM

## 2024-03-21 RX ORDER — SODIUM CHLORIDE 9 MG/ML
40 INJECTION, SOLUTION INTRAVENOUS AS NEEDED
Status: DISCONTINUED | OUTPATIENT
Start: 2024-03-21 | End: 2024-03-21 | Stop reason: HOSPADM

## 2024-03-21 RX ORDER — SULFAMETHOXAZOLE/TRIMETHOPRIM 800-160 MG
1 TABLET ORAL 2 TIMES DAILY
COMMUNITY
Start: 2024-03-18

## 2024-03-21 RX ORDER — DIPHENHYDRAMINE HYDROCHLORIDE 50 MG/ML
12.5 INJECTION INTRAMUSCULAR; INTRAVENOUS
Status: DISCONTINUED | OUTPATIENT
Start: 2024-03-21 | End: 2024-03-21 | Stop reason: HOSPADM

## 2024-03-21 RX ORDER — ONDANSETRON 2 MG/ML
4 INJECTION INTRAMUSCULAR; INTRAVENOUS EVERY 6 HOURS PRN
Status: DISCONTINUED | OUTPATIENT
Start: 2024-03-21 | End: 2024-03-22 | Stop reason: HOSPADM

## 2024-03-21 RX ORDER — PROPOFOL 10 MG/ML
INJECTION, EMULSION INTRAVENOUS AS NEEDED
Status: DISCONTINUED | OUTPATIENT
Start: 2024-03-21 | End: 2024-03-21 | Stop reason: SURG

## 2024-03-21 RX ORDER — OXYCODONE HYDROCHLORIDE 5 MG/1
10 TABLET ORAL EVERY 4 HOURS PRN
Status: DISCONTINUED | OUTPATIENT
Start: 2024-03-21 | End: 2024-03-22 | Stop reason: HOSPADM

## 2024-03-21 RX ORDER — DULOXETIN HYDROCHLORIDE 30 MG/1
30 CAPSULE, DELAYED RELEASE ORAL 2 TIMES DAILY
Status: DISCONTINUED | OUTPATIENT
Start: 2024-03-21 | End: 2024-03-22 | Stop reason: HOSPADM

## 2024-03-21 RX ORDER — ASPIRIN 81 MG/1
81 TABLET ORAL EVERY 12 HOURS SCHEDULED
Status: DISCONTINUED | OUTPATIENT
Start: 2024-03-21 | End: 2024-03-22 | Stop reason: HOSPADM

## 2024-03-21 RX ORDER — SODIUM CHLORIDE 0.9 % (FLUSH) 0.9 %
10 SYRINGE (ML) INJECTION AS NEEDED
Status: DISCONTINUED | OUTPATIENT
Start: 2024-03-21 | End: 2024-03-21 | Stop reason: HOSPADM

## 2024-03-21 RX ORDER — EPHEDRINE SULFATE 5 MG/ML
5 INJECTION INTRAVENOUS ONCE AS NEEDED
Status: DISCONTINUED | OUTPATIENT
Start: 2024-03-21 | End: 2024-03-21 | Stop reason: HOSPADM

## 2024-03-21 RX ORDER — DEXAMETHASONE SODIUM PHOSPHATE 4 MG/ML
INJECTION, SOLUTION INTRA-ARTICULAR; INTRALESIONAL; INTRAMUSCULAR; INTRAVENOUS; SOFT TISSUE
Status: COMPLETED | OUTPATIENT
Start: 2024-03-21 | End: 2024-03-21

## 2024-03-21 RX ORDER — SODIUM CHLORIDE 0.9 % (FLUSH) 0.9 %
10 SYRINGE (ML) INJECTION EVERY 12 HOURS SCHEDULED
Status: DISCONTINUED | OUTPATIENT
Start: 2024-03-21 | End: 2024-03-21 | Stop reason: HOSPADM

## 2024-03-21 RX ORDER — FLUMAZENIL 0.1 MG/ML
0.2 INJECTION INTRAVENOUS AS NEEDED
Status: DISCONTINUED | OUTPATIENT
Start: 2024-03-21 | End: 2024-03-21 | Stop reason: HOSPADM

## 2024-03-21 RX ORDER — ONDANSETRON 4 MG/1
4 TABLET, ORALLY DISINTEGRATING ORAL EVERY 6 HOURS PRN
Status: DISCONTINUED | OUTPATIENT
Start: 2024-03-21 | End: 2024-03-22 | Stop reason: HOSPADM

## 2024-03-21 RX ORDER — ASPIRIN 81 MG/1
81 TABLET ORAL EVERY 12 HOURS
Qty: 60 TABLET | Refills: 0 | Status: SHIPPED | OUTPATIENT
Start: 2024-03-21 | End: 2024-04-21

## 2024-03-21 RX ORDER — LIDOCAINE HYDROCHLORIDE 20 MG/ML
INJECTION, SOLUTION EPIDURAL; INFILTRATION; INTRACAUDAL; PERINEURAL AS NEEDED
Status: DISCONTINUED | OUTPATIENT
Start: 2024-03-21 | End: 2024-03-21 | Stop reason: SURG

## 2024-03-21 RX ORDER — SODIUM CHLORIDE, SODIUM LACTATE, POTASSIUM CHLORIDE, CALCIUM CHLORIDE 600; 310; 30; 20 MG/100ML; MG/100ML; MG/100ML; MG/100ML
20 INJECTION, SOLUTION INTRAVENOUS ONCE
Status: COMPLETED | OUTPATIENT
Start: 2024-03-21 | End: 2024-03-21

## 2024-03-21 RX ORDER — LEVOTHYROXINE SODIUM 25 UG/1
25 TABLET ORAL
Status: DISCONTINUED | OUTPATIENT
Start: 2024-03-22 | End: 2024-03-22 | Stop reason: HOSPADM

## 2024-03-21 RX ORDER — SODIUM CHLORIDE 9 MG/ML
100 INJECTION, SOLUTION INTRAVENOUS CONTINUOUS
Status: DISCONTINUED | OUTPATIENT
Start: 2024-03-21 | End: 2024-03-22 | Stop reason: HOSPADM

## 2024-03-21 RX ORDER — DOCUSATE SODIUM 100 MG/1
100 CAPSULE, LIQUID FILLED ORAL 2 TIMES DAILY PRN
Status: DISCONTINUED | OUTPATIENT
Start: 2024-03-21 | End: 2024-03-22 | Stop reason: HOSPADM

## 2024-03-21 RX ORDER — MIDAZOLAM HYDROCHLORIDE 1 MG/ML
INJECTION INTRAMUSCULAR; INTRAVENOUS
Status: COMPLETED | OUTPATIENT
Start: 2024-03-21 | End: 2024-03-21

## 2024-03-21 RX ADMIN — GABAPENTIN 300 MG: 300 CAPSULE ORAL at 07:01

## 2024-03-21 RX ADMIN — TRANEXAMIC ACID 1000 MG: 10 INJECTION, SOLUTION INTRAVENOUS at 08:15

## 2024-03-21 RX ADMIN — SODIUM CHLORIDE, SODIUM LACTATE, POTASSIUM CHLORIDE, AND CALCIUM CHLORIDE: .6; .31; .03; .02 INJECTION, SOLUTION INTRAVENOUS at 07:59

## 2024-03-21 RX ADMIN — HYDROMORPHONE HYDROCHLORIDE 0.5 MG: 1 INJECTION, SOLUTION INTRAMUSCULAR; INTRAVENOUS; SUBCUTANEOUS at 08:26

## 2024-03-21 RX ADMIN — SODIUM CHLORIDE 2000 MG: 900 INJECTION INTRAVENOUS at 15:32

## 2024-03-21 RX ADMIN — Medication 100 MCG: at 08:53

## 2024-03-21 RX ADMIN — ROPIVACAINE HYDROCHLORIDE 30 ML: 5 INJECTION EPIDURAL; INFILTRATION; PERINEURAL at 07:37

## 2024-03-21 RX ADMIN — GLYCOPYRROLATE 0.2 MG: 0.2 INJECTION INTRAMUSCULAR; INTRAVENOUS at 08:21

## 2024-03-21 RX ADMIN — HYDROMORPHONE HYDROCHLORIDE 0.5 MG: 1 INJECTION, SOLUTION INTRAMUSCULAR; INTRAVENOUS; SUBCUTANEOUS at 09:34

## 2024-03-21 RX ADMIN — HYDROMORPHONE HYDROCHLORIDE 0.5 MG: 1 INJECTION, SOLUTION INTRAMUSCULAR; INTRAVENOUS; SUBCUTANEOUS at 08:32

## 2024-03-21 RX ADMIN — DEXAMETHASONE SODIUM PHOSPHATE 8 MG: 4 INJECTION, SOLUTION INTRA-ARTICULAR; INTRALESIONAL; INTRAMUSCULAR; INTRAVENOUS; SOFT TISSUE at 08:13

## 2024-03-21 RX ADMIN — OXYCODONE 5 MG: 5 TABLET ORAL at 21:37

## 2024-03-21 RX ADMIN — SODIUM CHLORIDE, POTASSIUM CHLORIDE, SODIUM LACTATE AND CALCIUM CHLORIDE 20 ML/HR: 600; 310; 30; 20 INJECTION, SOLUTION INTRAVENOUS at 07:13

## 2024-03-21 RX ADMIN — OXYCODONE 10 MG: 5 TABLET ORAL at 21:38

## 2024-03-21 RX ADMIN — SODIUM CHLORIDE 100 ML/HR: 9 INJECTION, SOLUTION INTRAVENOUS at 14:50

## 2024-03-21 RX ADMIN — HYDROMORPHONE HYDROCHLORIDE 0.5 MG: 1 INJECTION, SOLUTION INTRAMUSCULAR; INTRAVENOUS; SUBCUTANEOUS at 09:37

## 2024-03-21 RX ADMIN — FENTANYL CITRATE 100 MCG: 50 INJECTION, SOLUTION INTRAMUSCULAR; INTRAVENOUS at 08:05

## 2024-03-21 RX ADMIN — HYDROMORPHONE HYDROCHLORIDE 1 MG: 1 INJECTION, SOLUTION INTRAMUSCULAR; INTRAVENOUS; SUBCUTANEOUS at 09:54

## 2024-03-21 RX ADMIN — VANCOMYCIN HYDROCHLORIDE 1250 MG: 1.25 INJECTION, POWDER, LYOPHILIZED, FOR SOLUTION INTRAVENOUS at 07:30

## 2024-03-21 RX ADMIN — HYDROCHLOROTHIAZIDE 25 MG: 25 TABLET ORAL at 15:32

## 2024-03-21 RX ADMIN — HYDROMORPHONE HYDROCHLORIDE 0.5 MG: 1 INJECTION, SOLUTION INTRAMUSCULAR; INTRAVENOUS; SUBCUTANEOUS at 12:13

## 2024-03-21 RX ADMIN — CEFAZOLIN 2000 MG: 2 INJECTION, POWDER, FOR SOLUTION INTRAMUSCULAR; INTRAVENOUS at 08:01

## 2024-03-21 RX ADMIN — DEXAMETHASONE SODIUM PHOSPHATE 4 MG: 4 INJECTION, SOLUTION INTRAMUSCULAR; INTRAVENOUS at 07:37

## 2024-03-21 RX ADMIN — DULOXETINE HYDROCHLORIDE 30 MG: 30 CAPSULE, DELAYED RELEASE ORAL at 21:37

## 2024-03-21 RX ADMIN — CELECOXIB 200 MG: 200 CAPSULE ORAL at 07:00

## 2024-03-21 RX ADMIN — MIDAZOLAM 2 MG: 1 INJECTION INTRAMUSCULAR; INTRAVENOUS at 07:32

## 2024-03-21 RX ADMIN — LIDOCAINE HYDROCHLORIDE 100 MG: 20 INJECTION, SOLUTION EPIDURAL; INFILTRATION; INTRACAUDAL; PERINEURAL at 08:05

## 2024-03-21 RX ADMIN — FENTANYL CITRATE 50 MCG: 50 INJECTION, SOLUTION INTRAMUSCULAR; INTRAVENOUS at 07:32

## 2024-03-21 RX ADMIN — OXYCODONE HYDROCHLORIDE 10 MG: 10 TABLET, FILM COATED, EXTENDED RELEASE ORAL at 07:01

## 2024-03-21 RX ADMIN — ACETAMINOPHEN 1000 MG: 500 TABLET, FILM COATED ORAL at 07:00

## 2024-03-21 RX ADMIN — ONDANSETRON 4 MG: 2 INJECTION INTRAMUSCULAR; INTRAVENOUS at 08:13

## 2024-03-21 RX ADMIN — PROPOFOL INJECTABLE EMULSION 150 MCG/KG/MIN: 10 INJECTION, EMULSION INTRAVENOUS at 08:05

## 2024-03-21 RX ADMIN — ASPIRIN 81 MG: 81 TABLET, COATED ORAL at 21:37

## 2024-03-21 RX ADMIN — PROPOFOL 150 MG: 10 INJECTION, EMULSION INTRAVENOUS at 08:05

## 2024-03-21 NOTE — THERAPY EVALUATION
Patient Name: Amita Arguello  : 1960    MRN: [id number]                              Today's Date: 3/21/2024       Admit Date: 3/21/2024    Visit Dx: No diagnosis found.  Patient Active Problem List   Diagnosis    Total knee replacement status    Status post knee replacement    Osteoarthritis of right knee     Past Medical History:   Diagnosis Date    Arthritis     Disease of thyroid gland     hypothyroid    GERD (gastroesophageal reflux disease)     MRSA colonization     Peripheral edema     Rheumatoid arthritis     Spinal stenosis     Vitamin D deficiency      Past Surgical History:   Procedure Laterality Date    LAPAROSCOPIC TUBAL LIGATION      TOTAL KNEE ARTHROPLASTY Left 10/15/2021    Procedure: TOTAL KNEE ARTHROPLASTY;  Surgeon: Pacheco Arguello II, MD;  Location: [facility] OR;  Service: Orthopedics;  Laterality: Left;      General Information       Row Name 24 162          Physical Therapy Time and Intention    Document Type evaluation  -SS (r) BH (t) SS (c)     Mode of Treatment physical therapy  -SS (r) BH (t) SS (c)       Row Name 24 162          General Information    Patient Profile Reviewed yes  -SS (r) BH (t) SS (c)     Prior Level of Function independent:;all household mobility;community mobility;ADL's;driving  -SS (r) BH (t) SS (c)     Existing Precautions/Restrictions weight bearing  WBAT RLE  -SS (r) BH (t) SS (c)     Barriers to Rehab none identified  -SS (r) BH (t) SS (c)       Row Name 24 162          Living Environment    People in Home other (see comments);alone  Pt is going to stay with daughter during recovery  -SS (r) BH (t) SS (c)       Row Name 24 162          Home Main Entrance    Number of Stairs, Main Entrance none  -SS (r) BH (t) SS (c)       Row Name 24 162          Stairs Within Home, Primary    Number of Stairs, Within Home, Primary none  -SS (r) BH (t) SS (c)       Row Name 24 162          Cognition    Orientation Status  (Cognition) oriented x 4  -SS (r) BH (t) SS (c)       Row Name 03/21/24 1621          Safety Issues, Functional Mobility    Impairments Affecting Function (Mobility) pain;endurance/activity tolerance;range of motion (ROM);strength  -SS (r) BH (t) SS (c)               User Key  (r) = Recorded By, (t) = Taken By, (c) = Cosigned By      Initials Name Provider Type    SS Angelica Tariq, PT Physical Therapist     Carolann Gonzalez PT Student PT Student                   Mobility       Row Name 03/21/24 1623          Bed Mobility    Bed Mobility bed mobility (all) activities  -SS (r) BH (t) SS (c)     All Activities, [place] (Bed Mobility) standby assist  -SS (r) BH (t) SS (c)     Assistive Device (Bed Mobility) bed rails;head of bed elevated  -SS (r) BH (t) SS (c)       Row Name 03/21/24 1623          Bed-Chair Transfer    Bed-Chair [place] (Transfers) contact guard  -SS (r) BH (t) SS (c)     Assistive Device (Bed-Chair Transfers) walker, front-wheeled  -SS (r) BH (t) SS (c)       Row Name 03/21/24 1623          Sit-Stand Transfer    Sit-Stand [place] (Transfers) contact guard  -SS (r) BH (t) SS (c)     Assistive Device (Sit-Stand Transfers) walker, front-wheeled  -SS (r) BH (t) SS (c)       Row Name 03/21/24 1623          Gait/Stairs (Locomotion)    Gait/Stairs Locomotion gait/ambulation independence  -SS (r) BH (t) SS (c)     [place] Level (Gait) contact guard  -SS (r) BH (t) SS (c)     Assistive Device (Gait) walker, front-wheeled  -SS (r) BH (t) SS (c)     Patient was able to Ambulate yes  -SS (r) BH (t) SS (c)     Distance in Feet (Gait) 20  -SS (r) BH (t) SS (c)     Pattern (Gait) step-to  -SS (r) BH (t) SS (c)     Bilateral Gait Deviations forward flexed posture  -SS (r) BH (t) SS (c)       Row Name 03/21/24 1623          Mobility    Extremity Weight-bearing Status right lower extremity  -SS (r) BH (t) SS (c)     Right Lower Extremity (Weight-bearing Status) weight-bearing as tolerated  (WBAT)  -SS (r) BH (t) SS (c)               User Key  (r) = Recorded By, (t) = Taken By, (c) = Cosigned By      Initials Name Provider Type    SS Angelica Tariq, PT Physical Therapist    Carolann Whitfield, PT Student PT Student                   Obj/Interventions       Row Name 03/21/24 1624          Range of Motion Comprehensive    General Range of Motion lower extremity range of motion deficits identified  -SS (r) BH (t) SS (c)     Comment, General Range of Motion R knee 0-45 deg  -SS (r) BH (t) SS (c)       Row Name 03/21/24 1624          Strength Comprehensive (MMT)    General Manual Muscle Testing (MMT) Assessment lower extremity strength deficits identified  -SS (r) BH (t) SS (c)     Comment, General Manual Muscle Testing (MMT) Assessment RLE 3+/5, limited by pain  -SS (r) BH (t) SS (c)       Row Name 03/21/24 1624          Balance    Balance Assessment sitting static balance;sitting dynamic balance;sit to stand dynamic balance;standing static balance  -SS (r) BH (t) SS (c)     Static Sitting Balance independent  -SS (r) BH (t) SS (c)     Dynamic Sitting Balance supervision  -SS (r) BH (t) SS (c)     Position, Sitting Balance sitting in chair;sitting edge of bed  -SS (r) BH (t) SS (c)     Sit to Stand Dynamic Balance contact guard  -SS (r) BH (t) SS (c)     Static Standing Balance contact guard  -SS (r) BH (t) SS (c)     Position/Device Used, Standing Balance walker, front-wheeled  -SS (r) BH (t) SS (c)               User Key  (r) = Recorded By, (t) = Taken By, (c) = Cosigned By      Initials Name Provider Type    SS Angelica Tariq, PT Physical Therapist    Carolann Whitfield, PT Student PT Student                   Goals/Plan       Row Name 03/21/24 1637          Bed Mobility Goal 1 (PT)    Activity/Assistive Device (Bed Mobility Goal 1, PT) bed mobility activities, all  -SS (r) BH (t) SS (c)     [place] Level/Cues Needed (Bed Mobility Goal 1, PT) independent  -SS (r) BH (t) SS (c)      Time Frame (Bed Mobility Goal 1, PT) long term goal (LTG);2 weeks  -SS (r) BH (t) SS (c)       Row Name 03/21/24 1637          Transfer Goal 1 (PT)    Activity/Assistive Device (Transfer Goal 1, PT) transfers, all;walker, rolling  -SS (r) BH (t) SS (c)     [place] Level/Cues Needed (Transfer Goal 1, PT) modified independence  -SS (r) BH (t) SS (c)     Time Frame (Transfer Goal 1, PT) long term goal (LTG);2 weeks  -SS (r) BH (t) SS (c)       Row Name 03/21/24 1637          Gait Training Goal 1 (PT)    Activity/Assistive Device (Gait Training Goal 1, PT) gait (walking locomotion);walker, rolling  -SS (r) BH (t) SS (c)     [place] Level (Gait Training Goal 1, PT) modified independence  -SS (r) BH (t) SS (c)     Distance (Gait Training Goal 1, PT) 100 ft  -SS (r) BH (t) SS (c)     Time Frame (Gait Training Goal 1, PT) long term goal (LTG);2 weeks  -SS (r) BH (t) SS (c)       Row Name 03/21/24 1637          Therapy Assessment/Plan (PT)    Planned Therapy Interventions (PT) ROM (range of motion);bed mobility training;patient/family education;gait training;strengthening;transfer training  -SS (r) BH (t) SS (c)               User Key  (r) = Recorded By, (t) = Taken By, (c) = Cosigned By      Initials Name Provider Type    SS Angelica Tariq, PT Physical Therapist     Carolann Gonzalez, PT Student PT Student                   Clinical Impression       Row Name 03/21/24 1627          Pain    Pretreatment Pain Rating 0/10 - no pain  -SS (r) BH (t) SS (c)     Posttreatment Pain Rating 0/10 - no pain  -SS (r) BH (t) SS (c)       Row Name 03/21/24 1627          Plan of Care Review    Plan of Care Reviewed With patient  -SS (r) BH (t) SS (c)     Outcome Evaluation Pt is a 64 y/o female with a PMH of peripheral edema, RA, and spinal stenosis, POD#0 R TKA. PLOF is indep with all ADLs, mobility w/o AD, and driving. Pt will be staying with her daughter during recovery in a SSH with no YANETH. Pt is WBAT on RLE. Today pt  able to ambulate 20ft with RW CGA with a step to gait pattern. Pt able to toilet independently. Pt educated on mobility frequency, home exercises, and pain control during recovery, demonstrating understanding. She completed HEP AAROM, with R knee ROM 0-45 deg. Pt BP remained WNL throughout session. Pt slightly bradycardic, 62bpm before and 56bpm after activity in sitting, no symptoms reported. Pt safe to return home with assist and HH PT and PT will follow until d/c.  -SS (r) BH (t) SS (c)       Row Name 03/21/24 1627          Therapy Assessment/Plan (PT)    Criteria for Skilled Interventions Met (PT) yes  -SS (r) BH (t) SS (c)     Therapy Frequency (PT) 2 times/day  -SS (r) BH (t) SS (c)       Row Name 03/21/24 1627          Vital Signs    Pre Systolic BP Rehab 130  -SS (r) BH (t) SS (c)     Pre Treatment Diastolic BP 72  -SS (r) BH (t) SS (c)     Intra Systolic BP Rehab 133  -SS (r) BH (t) SS (c)     Intra Treatment Diastolic BP 71  -SS (r) BH (t) SS (c)     Post Systolic BP Rehab 126  -SS (r) BH (t) SS (c)     Post Treatment Diastolic BP 87  -SS (r) BH (t) SS (c)     Pretreatment Heart Rate (beats/min) 62  -SS (r) BH (t) SS (c)     Posttreatment Heart Rate (beats/min) 56  -SS (r) BH (t) SS (c)     Pre SpO2 (%) 98  -SS (r) BH (t) SS (c)     O2 Delivery Pre Treatment room air  -SS (r) BH (t) SS (c)     Post SpO2 (%) 97  -SS (r) BH (t) SS (c)     O2 Delivery Post Treatment room air  -SS (r) BH (t) SS (c)     Pre Patient Position Sitting  -SS (r) BH (t) SS (c)     Intra Patient Position Standing  -SS (r) BH (t) SS (c)     Post Patient Position Sitting  -SS (r) BH (t) SS (c)       Row Name 03/21/24 1627          Positioning and Restraints    Pre-Treatment Position in bed  -SS (r) BH (t) SS (c)     Post Treatment Position chair  -SS (r) BH (t) SS (c)     In Chair reclined;call light within reach;notified nsg;encouraged to call for assist;exit alarm on  -SS (r) BH (t) SS (c)               User Key  (r) = Recorded By,  (t) = Taken By, (c) = Cosigned By      Initials Name Provider Type    SS Angelica Traiq, PT Physical Therapist     Carolann Gonzalez, PT Student PT Student                   Outcome Measures       Row Name 03/21/24 1416 03/21/24 1415       How much help from another person do you currently need...    Turning from your back to your side while in flat bed without using bedrails? 4  -SM 3  -SM    Moving from lying on back to sitting on the side of a flat bed without bedrails? 3  -SM 3  -SM    Moving to and from a bed to a chair (including a wheelchair)? 3  -SM 3  -SM    Standing up from a chair using your arms (e.g., wheelchair, bedside chair)? 3  -SM 3  -SM    Climbing 3-5 steps with a railing? 2  -SM 2  -SM    To walk in hospital room? 2  -SM 2  -SM    AM-PAC 6 Clicks Score (PT) 17  -SM 16  -SM    Highest Level of Mobility Goal 5 --> Static standing  -SM 5 --> Static standing  -SM              User Key  (r) = Recorded By, (t) = Taken By, (c) = Cosigned By      Initials Name Provider Type     Ana M Moran, RN Registered Nurse                                 Physical Therapy Education       Title: PT OT SLP Therapies (Done)       Topic: Physical Therapy (Done)       Point: Mobility training (Done)       Learning Progress Summary             Patient Acceptance, E, VU by  at 3/21/2024 1638                         Point: Home exercise program (Done)       Learning Progress Summary             Patient Acceptance, E, VU by  at 3/21/2024 1638                         Point: Body mechanics (Done)       Learning Progress Summary             Patient Acceptance, E, VU by  at 3/21/2024 1638                         Point: Precautions (Done)       Learning Progress Summary             Patient Acceptance, E, VU by  at 3/21/2024 1638                                         User Key       Initials Effective Dates Name Provider Type [facility] 01/15/24 -  Carolann Gonzalez, PT Student PT Student PT                   PT Recommendation and Plan  Planned Therapy Interventions (PT): ROM (range of motion), bed mobility training, patient/family education, gait training, strengthening, transfer training  Plan of Care Reviewed With: patient  Outcome Evaluation: Pt is a 64 y/o female with a PMH of peripheral edema, RA, and spinal stenosis, POD#0 R TKA. PLOF is indep with all ADLs, mobility w/o AD, and driving. Pt will be staying with her daughter during recovery in a SSH with no YANETH. Pt is WBAT on RLE. Today pt able to ambulate 20ft with RW CGA with a step to gait pattern. Pt able to toilet independently. Pt educated on mobility frequency, home exercises, and pain control during recovery, demonstrating understanding. She completed HEP AAROM, with R knee ROM 0-45 deg. Pt BP remained WNL throughout session. Pt slightly bradycardic, 62bpm before and 56bpm after activity in sitting, no symptoms reported. Pt safe to return home with assist and HH PT and PT will follow until d/c.     Time Calculation:   PT Evaluation Complexity  History, PT Evaluation Complexity: 1-2 personal factors and/or comorbidities  Examination of Body Systems (PT Eval Complexity): total of 3 or more elements  Clinical Presentation (PT Evaluation Complexity): evolving  Clinical Decision Making (PT Evaluation Complexity): moderate complexity  Overall Complexity (PT Evaluation Complexity): moderate complexity     PT Charges       Row Name 03/21/24 1638             Time Calculation    Start Time 1450  -SS (r) BH (t) SS (c)      Stop Time 1521  -SS (r) BH (t) SS (c)      Time Calculation (min) 31 min  -SS (r) BH (t)      PT Received On 03/21/24  -SS (r) BH (t) SS (c)      PT - Next Appointment 03/22/24  -SS (r) BH (t) SS (c)      PT Goal Re-Cert Due Date 04/04/24  -SS (r) BH (t) SS (c)         Time Calculation- PT    Total Timed Code Minutes- PT 0 minute(s)  -SS (r) BH (t) SS (c)                User Key  (r) = Recorded By, (t) = Taken By, (c) = Cosigned By      Initials  Name Provider Type    SS Angelica Tariq, PT Physical Therapist     Carolann Gonzalez, PT Student PT Student                  Therapy Charges for Today       Code Description Service Date Service Provider Modifiers Qty    93816763403 HC PT SELF CARE/MGMT/TRAIN EA 15 MIN 3/21/2024 Carolann Gonzalez, PT Student GP 1    18073423746 HC PT THER PROC EA 15 MIN 3/21/2024 Carolann Gonzalez, PT Student GP 1    40935117100 HC PT THERAPEUTIC ACT EA 15 MIN 3/21/2024 Carolann Gonzalez, PT Student GP 1            PT G-Codes  AM-PAC 6 Clicks Score (PT): 17  PT Discharge Summary  Anticipated Discharge Disposition (PT): home with home health    Carolann Gonzalez, PT Student  3/21/2024

## 2024-03-21 NOTE — ANESTHESIA POSTPROCEDURE EVALUATION
Patient: Amita Arguello    Procedure Summary       Date: 03/21/24 Room / Location: [facility] OR 12 / [facility] MAIN OR    Anesthesia Start: 0759 Anesthesia Stop: 0940    Procedure: TOTAL KNEE ARTHROPLASTY WITH CORI ROBOT (Right: Knee) Diagnosis:       Unilateral primary osteoarthritis, right knee      (Unilateral primary osteoarthritis, right knee [M17.11])    Surgeons: Pacheco Arguello II, MD Provider: Mundo Saul MD    Anesthesia Type: general with block, ERAS Protocol ASA Status: 3            Anesthesia Type: general with block, ERAS Protocol    Vitals  Vitals Value Taken Time   /52 03/21/24 1132   Temp 97.3 °F (36.3 °C) 03/21/24 0940   Pulse 43 03/21/24 1140   Resp 11 03/21/24 1110   SpO2 98 % 03/21/24 1138   Vitals shown include unfiled device data.        Post Anesthesia Care and Evaluation    Patient location during evaluation: PACU  Patient participation: complete - patient participated  Level of consciousness: awake  Pain scale: See nurse's notes for pain score.  Pain management: adequate    Airway patency: patent  Anesthetic complications: No anesthetic complications  PONV Status: none  Cardiovascular status: acceptable  Respiratory status: acceptable and spontaneous ventilation  Hydration status: acceptable    Comments: Patient seen and examined postoperatively; vital signs stable; SpO2 greater than or equal to 90%; cardiopulmonary status stable; nausea/vomiting adequately controlled; pain adequately controlled; no apparent anesthesia complications; patient discharged from anesthesia care when discharge criteria were met

## 2024-03-21 NOTE — PLAN OF CARE
Problem: Adult Inpatient Plan of Care  Goal: Plan of Care Review  Outcome: Ongoing, Progressing  Flowsheets (Taken 3/21/2024 1420)  Plan of Care Reviewed With: patient  Goal: Patient-Specific Goal (Individualized)  Outcome: Ongoing, Progressing  Goal: Absence of Hospital-Acquired Illness or Injury  Outcome: Ongoing, Progressing  Goal: Optimal Comfort and Wellbeing  Outcome: Ongoing, Progressing  Goal: Readiness for Transition of Care  Outcome: Ongoing, Progressing  Intervention: Mutually Develop Transition Plan  Recent Flowsheet Documentation  Taken 3/21/2024 1417 by Ana M Moran, RN  Equipment Currently Used at Home: crutches, axillary  Transportation Anticipated: family or friend will provide  Patient/Family Anticipated Services at Transition: none  Patient/Family Anticipates Transition to: home with family   Goal Outcome Evaluation:  Plan of Care Reviewed With: patient

## 2024-03-21 NOTE — ANESTHESIA PREPROCEDURE EVALUATION
Anesthesia Evaluation     Patient summary reviewed and Nursing notes reviewed   no history of anesthetic complications:   NPO Solid Status: > 8 hours  NPO Liquid Status: > 8 hours           Airway   Mallampati: II  TM distance: >3 FB  Neck ROM: full  No difficulty expected  Dental - normal exam     Pulmonary     breath sounds clear to auscultation  (+) a smoker Former,  Cardiovascular - negative cardio ROS  Exercise tolerance: good (4-7 METS)    ECG reviewed  Rhythm: regular  Rate: normal        Neuro/Psych- negative ROS  GI/Hepatic/Renal/Endo    (+) morbid obesity, GERD, thyroid problem     Musculoskeletal     Abdominal     Abdomen: soft.   Substance History - negative use     OB/GYN negative ob/gyn ROS         Other   arthritis,                   Anesthesia Plan    ASA 3     general with block and ERAS Protocol   total IV anesthesia  intravenous induction     Anesthetic plan, risks, benefits, and alternatives have been provided, discussed and informed consent has been obtained with: patient.    Plan discussed with CRNA.

## 2024-03-21 NOTE — PLAN OF CARE
Goal Outcome Evaluation:  Plan of Care Reviewed With: patient           Outcome Evaluation: Pt is a 62 y/o female with a PMH of peripheral edema, RA, and spinal stenosis, POD#0 R TKA. PLOF is indep with all ADLs, mobility w/o AD, and driving. Pt will be staying with her daughter during recovery in a SSH with no YANETH. Pt is WBAT on RLE. Today pt able to ambulate 20ft with RW CGA with a step to gait pattern. Pt able to toilet independently. Pt educated on mobility frequency, home exercises, and pain control during recovery, demonstrating understanding. She completed HEP AAROM, with R knee ROM 0-45 deg. Pt BP remained WNL throughout session. Pt slightly bradycardic, 62bpm before and 56bpm after activity in sitting, no symptoms reported. Pt safe to return home with assist and HH PT and PT will follow until d/c.      Anticipated Discharge Disposition (PT): home with home health

## 2024-03-21 NOTE — ANESTHESIA PROCEDURE NOTES
Airway  Urgency: elective    Date/Time: 3/21/2024 8:08 AM  Airway not difficult    General Information and Staff    Patient location during procedure: OR    Indications and Patient Condition  Indications for airway management: airway protection    Preoxygenated: yes  Mask difficulty assessment: 0 - not attempted    Final Airway Details  Final airway type: supraglottic airway      Successful airway: LMA and I-gel  Size 4     Number of attempts at approach: 1  Assessment: lips, teeth, and gum same as pre-op and atraumatic intubation

## 2024-03-21 NOTE — H&P
Orthopaedic Surgery  History & Physical For Elective Total Knee  Dr. ELAINA Arguello II  [phone]    HPI:  Patient is a 63 y.o. Not  or  female who presents with End-stage arthritis of the right knee. They failed conservative treatment of their knee pain and a thorough discussion of the risks and benefits of surgery was had. The patient wishes to continue with elective total knee replacement, they were scheduled and are here for surgery. They did get medical clearance as well as a thorough preoperative workup.    MEDICAL HISTORY  Past Medical History:   Diagnosis Date    Arthritis     Disease of thyroid gland     hypothyroid    GERD (gastroesophageal reflux disease)     MRSA colonization     Peripheral edema     Rheumatoid arthritis     Spinal stenosis     Vitamin D deficiency      Past Surgical History:   Procedure Laterality Date    LAPAROSCOPIC TUBAL LIGATION      TOTAL KNEE ARTHROPLASTY Left 10/15/2021    Procedure: TOTAL KNEE ARTHROPLASTY;  Surgeon: Pacheco Arguello II, MD;  Location: [facility] MAIN OR;  Service: Orthopedics;  Laterality: Left;     Prior to Admission medications    Medication Sig Start Date End Date Taking? Authorizing Provider   Cholecalciferol (Vitamin D) 50 MCG (2000 UT) capsule Take 3 capsules by mouth Daily.    Magnus Zuluaga MD   DULoxetine (CYMBALTA) 30 MG capsule Take 1 capsule by mouth 2 (Two) Times a Day. 1/8/24   Magnus Zuluaga MD   esomeprazole (nexIUM) 40 MG capsule Take 1 capsule by mouth Daily. 12/18/23   Magnus Zuluaga MD   hydroCHLOROthiazide (HYDRODIURIL) 25 MG tablet Take 1 tablet by mouth Daily. 12/2/21   Magnus Zuluaga MD   indomethacin (INDOCIN) 50 MG capsule Take 1 capsule by mouth 3 (Three) Times a Day As Needed. 2/12/24   Magnus Zuluaga MD   levothyroxine (SYNTHROID, LEVOTHROID) 25 MCG tablet Take 1 tablet by mouth Daily. 12/5/21   Magnus Zuluaga MD   methotrexate 2.5 MG tablet Take 10 tablets by mouth 1  (One) Time Per Week. Fri    Magnus Zuluaga MD   phentermine 37.5 MG capsule Take 1 capsule by mouth Daily. 3/6/24   Magnus Zuluaga MD   potassium chloride (MICRO-K) 10 MEQ CR capsule Take 1 capsule by mouth Daily. 23  Magnus Zuluaga MD   predniSONE (DELTASONE) 5 MG tablet Take 1 tablet by mouth Daily.    Magnus Zuluaga MD     No Known Allergies  Most Recent Immunizations   Administered Date(s) Administered    COVID-19 (MODERNA) 1st,2nd,3rd Dose Monovalent 2022    Tdap 2010     Social History     Tobacco Use    Smoking status: Former     Current packs/day: 0.00     Average packs/day: 1 pack/day for 17.0 years (17.0 ttl pk-yrs)     Types: Cigarettes     Start date:      Quit date:      Years since quittin.2    Smokeless tobacco: Never   Substance Use Topics    Alcohol use: Not Currently      Social History     Substance and Sexual Activity   Drug Use Never       REVIEW OF SYSTEMS:  Head: negative for headache  Respiratory: negative for shortness of breath.   Cardiovascular: negative for chest pain.   Gastrointestinal: negative abdominal pain.   Neurological: negative for LOC  Psychiatric/Behavioral: negative for memory loss.   All other systems reviewed and are negative    VITALS: There were no vitals taken for this visit. There is no height or weight on file to calculate BMI.    PHYSICAL EXAM:   CONSTITUTIONAL: A&Ox3, No acute distress  LUNGS: Equal chest rise, no shortness of air  CARDIOVASCULAR: palpable peripheral pulses  SKIN: no skin lesions in the area examined  LYMPH: no lymphadenopathy in the area examined  EXTREMITY: Knee  Pulses:  Brisk Capillary Refill  Sensation: Intact to Saphenous, Sural, Deep Peroneal, Superficial Peroneal, and Tibial Nerves and grossly throughout extremity  Motor: 5/5 EHL/FHL/TA/GS motor complexes    RADIOLOGY REVIEW:   XR Chest PA & Lateral    Result Date: 3/17/2024  Impression: No acute cardiopulmonary process.  Electronically Signed: Edvin Mena MD  3/17/2024 6:07 PM EDT  Workstation ID: [id number]     LABS:   Results for the past 24 hours: No results found for this or any previous visit (from the past 24 hour(s)).    IMPRESSION:  Patient is a 63 y.o. Not  or  female with end-stage arthritis of the right knee    PLAN:   Surgery: Elective total knee arthroplasty  Consent: The risks and benefits of operative versus nonoperative treatment were discussed. The patient elected to undergo operative treatment of their knee arthritis. The risks discussed included but were not limited to blood clots, MI, stroke, other medical complications, infection, damage to neurovascular structures, continued pain, hardware prominence, loss of range of motion, need for further procedures, and and risk of anesthesia..  No guarantees were made   Disposition: Elective right Total Knee Arthroplasty today.    Pacheco Arguello II, MD  Orthopaedic Surgery  [facility]

## 2024-03-21 NOTE — ANESTHESIA PROCEDURE NOTES
Peripheral Block      Patient reassessed immediately prior to procedure    Patient location during procedure: pre-op  Reason for block: at surgeon's request and post-op pain management  Performed by  Anesthesiologist: Mundo Saul MD  Preanesthetic Checklist  Completed: patient identified, IV checked, site marked, risks and benefits discussed, surgical consent, monitors and equipment checked, pre-op evaluation and timeout performed  Prep:  Pt Position: supine  Sterile barriers:cap, gloves, sterile barriers and mask  Prep: ChloraPrep  Patient monitoring: blood pressure monitoring, continuous pulse oximetry and EKG  Procedure    Sedation: yes  Performed under: MAC  Guidance:ultrasound guided    ULTRASOUND INTERPRETATION.  Using ultrasound guidance a 20 G gauge needle was placed in close proximity to the femoral nerve, at which point, under ultrasound guidance anesthetic was injected in the area of the nerve and spread of the anesthesia was seen on ultrasound in close proximity thereto.  There were no abnormalities seen on ultrasound; a digital image was taken; and the patient tolerated the procedure with no complications. Images:still images obtained, printed/placed on chart    Laterality:right  Block Type:adductor canal block  Injection Technique:single-shot  Needle Type:echogenic  Needle Gauge:20 G  Resistance on Injection: none  Sedation medications used: midazolam (VERSED) injection - Intravenous   2 mg - 3/21/2024 7:32:00 AM  fentaNYL citrate (PF) (SUBLIMAZE) injection - Intravenous   50 mcg - 3/21/2024 7:32:00 AM  Medications Used: dexamethasone (DECADRON) injection - Injection   4 mg - 3/21/2024 7:37:00 AM  ropivacaine (NAROPIN) 0.5 % injection - Perineural   30 mL - 3/21/2024 7:37:00 AM      Post Assessment  Injection Assessment: negative aspiration for heme, no paresthesia on injection and incremental injection  Patient Tolerance:comfortable throughout block  Complications:no

## 2024-03-21 NOTE — OP NOTE
ROBOTIC Total Knee Replacement Operative Note  Dr. ELAINA Luna” Saundra SALGUERO  [phone]    PATIENT NAME: Amita Arguello  MRN: [id number]  : 1960 AGE: 63 y.o. GENDER: female  DATE OF OPERATION: 3/21/2024  PREOPERATIVE DIAGNOSIS: End Stage Arthritis  POSTOPERATIVE DIAGNOSIS: Same  OPERATION PERFORMED: Right Robotic Assisted Total Knee Arthroplasty  SURGEON: Pacheco Arguello MD  Circulator: Radha Tuttle RN; Benson Chaudhry RN; Lucretia Perdomo RN  Scrub Person: Cheyenne Craig  Vendor Representative: Julita Atkins  Assistant: Cecile Card CSFA  ANESTHESIA: General with Block  ASSISTANT: Cecile Card. This case would not have been possible without another set of skilled surgical hands for retraction, use of instrumentation, and general assistance.  This assistance was vital to the success of the case.   ESTIMATED BLOOD LOSS: 100cc  SPONGE AND NEEDLE COUNT: Correct  INDICATIONS:   A discussion of operative versus nonoperative treatment was had with the patient and they failed conservative management. They elected to undergo total knee arthroplasty. The risks of surgery were discussed and included the risk of anesthesia, infection, damage to neurovascular structures, implant loosening/failure, fracture, hardware prominence, continued pain, early failure, the need for further procedures, medical complications, and others. No guarantees were made. The patient wished to proceed with surgery and a surgical consent was signed.    COMPONENTS:   Journey II BCS Oxinium Femoral Component: Size 4  Journey II Tibial Baseplate: 3 with 10 x 100 stem extension  Posterior Stabilized Insert: 13  Patella: 35mm    PERTINENT FINDINGS: Degenerative Arthritis    DETAILS OF PROCEDURE:  The patient was met in the preoperative area. The site was marked. The consent and H&P were reviewed. The patient was then wheeled back to the operative suite and transferred to the operative table. The patient underwent anesthesia. A  tourniquet was placed on the upper thigh. Surgical alcohol was used to thoroughly clean the entire operative extremity.     The leg was then prepped in the normal sterile fashion and surgical space suits were used for the entire operative team. New outer gloves were used by all sterile surgical team members after final draping. After a surgical timeout, the tourniquet was inflated.     I began by inserting 2 pins into the tibial and femoral shafts and attaching the tibial and femoral robotic .    In flexion, a midline knee incision was utilized centered on the patella and ending medial to the tibial tubercle. Dissection was carried down to the knee capsule. A medial parapatellar ararthrotomy was completed. The patellar fat pad was excised. The MCL was minimally elevated to gain adequate exposure to the knee.  The suprapatellar fat pad was also excised.  The patella was subluxed laterally. The patella was held vertical using 2 clamps, and was then cut using a saw. The patella was then sized, and the lug holes were drilled. Excess patellar bone was removed using a saw. The patella was then protected during this case using the metal patella shield.    The ACL remnant, anterior horn of the lateral meniscus, and PCL were then resected.  Next I proceeded with a standard mapping of the knee including all relevant anatomic positions, range of motion, and stressing of ligaments.  I then planned out the knee including implant sizes, rotation, and bony resection to appropriately balance the knee as needed.      After the mapping and planning was complete, I turned my attention to the distal femur.  Using the bur, I was able to remove the distal femoral bone and create the initial lug holes.  I then used the punch to create the pinholes for the 5-in-1 cutting block.  The 5-in-1 cutting block was then snapped into place and pinned.  I used a saw to resect the anterior posterior and chamfer cuts.  These were all removed  using a rongeur.    I then turned my attention to the tibia.  Retractors were placed appropriately.  Using the robot for guidance, a cutting jig was attached to the tibia using 2 pins.  This was done just above the level of measured resection.  I then used a saw to cut the tibia and remove this bone.  At that point, I was able to use the bur to resect down to the actual intended target tibial resection line.  This was verified to be accurate afterwards on the robot screen.    At that point, the remaining meniscus and osteophytes were removed.  I then attached the femoral component which was well-seated. The femoral BCS box was then prepared for.  I then trialed the knee.  Any additional bony resection and/or soft tissue releases were then noted and performed at that time to fully balance the knee.    We next turned our attention back to the tibia to finish the tibial preparation. The tibia was measured and sized. The tibial baseplate was aligned with the rotation from the trialing process and verified to be positioned near the medial third of the tibial tubercle. The tibial surface was then prepared for the keel.  Additionally, due to her soft bone I did elect to utilize a short stem extension on the tibia.  This was then prepared for her.    The knee was thoroughly irrigated with sterile saline using a pulse-lavage system while the final tibial baseplate, femoral component and patellar component were opened. Cement was prepared and mixed using standard techniques. Outer gloves were changed before implant handling to ensure no soft tissue or oily material was exposed to the surfaces of the final implants. The bony knee surfaces were dried and the implants were cemented in place, starting with the tibia, then the femur and finally the patella. Excess cement was removed at each step. A trial poly was utilized during cementation for compression. The tourniquet was taken down and adequate hemostasis was achieved. The  "knee was thoroughly irrigated once again.     The soft tissues about the knee were then injected with an anesthetic cocktail. Care was taken to avoid the peroneal nerve and the neurovascular bundle posteriorly. The cement was allowed to harden.  While the cement was hardening, I did remove all 4 pins those sites were closed.    After the cement was fully set, the knee was ranged with various thickness of polyethylene trials to ensure that the balance was appropriate after robotic resection. The knee was inspected for excess cement, which was removed. The real poly, of corresponding thickness was then opened and inserted into the knee. One final range of motion and stability test showed the knee to be in good condition with a well tracking patellar component.    The knee capsule was then closed after applying 1 g vancomycin.  The knee was then closed in layers.  A sterile dressing was applied.    The patient was awoken from anesthesia, moved to the [facility] and taken to the recovery room in stable condition. Sponge and needle count were correct. There were no complications. Patient tolerated the procedure well.    R \"Zafar\" Saundra SALGUERO MD  Orthopaedic Surgery  [facility]  [phone]                "

## 2024-03-22 VITALS
DIASTOLIC BLOOD PRESSURE: 72 MMHG | WEIGHT: 252.6 LBS | TEMPERATURE: 98 F | BODY MASS INDEX: 46.48 KG/M2 | HEIGHT: 62 IN | HEART RATE: 61 BPM | OXYGEN SATURATION: 97 % | RESPIRATION RATE: 18 BRPM | SYSTOLIC BLOOD PRESSURE: 124 MMHG

## 2024-03-22 PROCEDURE — 97110 THERAPEUTIC EXERCISES: CPT

## 2024-03-22 PROCEDURE — 97116 GAIT TRAINING THERAPY: CPT

## 2024-03-22 PROCEDURE — 25010000002 CEFAZOLIN PER 500 MG: Performed by: ORTHOPAEDIC SURGERY

## 2024-03-22 RX ADMIN — OXYCODONE 10 MG: 5 TABLET ORAL at 11:36

## 2024-03-22 RX ADMIN — HYDROCHLOROTHIAZIDE 25 MG: 25 TABLET ORAL at 08:42

## 2024-03-22 RX ADMIN — OXYCODONE 10 MG: 5 TABLET ORAL at 07:28

## 2024-03-22 RX ADMIN — MELOXICAM 15 MG: 15 TABLET ORAL at 07:29

## 2024-03-22 RX ADMIN — LEVOTHYROXINE SODIUM 25 MCG: 0.03 TABLET ORAL at 05:47

## 2024-03-22 RX ADMIN — DULOXETINE HYDROCHLORIDE 30 MG: 30 CAPSULE, DELAYED RELEASE ORAL at 08:42

## 2024-03-22 RX ADMIN — ASPIRIN 81 MG: 81 TABLET, COATED ORAL at 08:52

## 2024-03-22 RX ADMIN — SODIUM CHLORIDE 2000 MG: 900 INJECTION INTRAVENOUS at 00:44

## 2024-03-22 NOTE — THERAPY TREATMENT NOTE
"Subjective: Pt agreeable to therapeutic plan of care.     Objective:     Bed mobility - N/A or Not attempted. Pt up in chair upon PT's arrival  Transfers - Supervision  Ambulation - 40 feet Supervision with RW; decreased heel strike right    WB'ing status: R Lower Extremity Weight Bearing As Tolerated    ROM: -10 to 66 degrees    Therapeutic Exercise: 10 Reps R Lower Extremity  Total Knee: Ankle Pumps, Quad Sets, Heel slides, Hip Abduction, LAQ    Precautions: Weight-bearing restriction      Vitals: WNL    Pain: 7 VAS   Location: R knee; pt states 'its a different pain that before surgery; it feels like it will get better'  Intervention for pain: Repositioned and Increased Activity    Education: Gait Training, WB'ing status, and Post-Op Precautions    Assessment: Amita A Sweet presents with functional mobility impairments which indicate the need for skilled intervention. Tolerating session today without incident. Will continue to follow and progress as tolerated. Pt has limited AROM to R knee but is progressing with gait and transfers.  She would benefit from HH PT at discharge.     Plan/Recommendations:   If medically appropriate, Low Intensity Therapy recommended post-acute care - This is recommended as therapy feels this patient would require 2-3 visits per week. OP or HH would be the best option depending on patient's home bound status. Consider, if the patient has other  \"skilled\" needs such as wounds, IV antibiotics, etc. Combined with \"low intensity\" could also equate to a SNF. If patient is medically complex, consider LTAC. Pt requires no DME at discharge.     Pt desires Home with Home Health at discharge. Pt cooperative; agreeable to therapeutic recommendations and plan of care.         Basic Mobility 6-click:  Rollin = Total, A lot = 2, A little = 3; 4 = None  Supine>Sit:   1 = Total, A lot = 2, A little = 3; 4 = None   Sit>Stand with arms:  1 = Total, A lot = 2, A little = 3; 4 = " None  Bed>Chair:   1 = Total, A lot = 2, A little = 3; 4 = None  Ambulate in room:  1 = Total, A lot = 2, A little = 3; 4 = None  3-5 Steps with railin = Total, A lot = 2, A little = 3; 4 = None  Score: 20    Modified Rittman: N/A = No pre-op stroke/TIA    Post-Tx Position: Up in Chair, Alarms activated, and Call light and personal items within reach  PPE: gloves

## 2024-03-22 NOTE — PLAN OF CARE
Problem: Pain Acute  Goal: Acceptable Pain Control and Functional Ability  Outcome: Ongoing, Progressing     Problem: Bleeding (Surgery Nonspecified)  Goal: Absence of Bleeding  Outcome: Ongoing, Progressing     Problem: Bowel Motility Impaired (Surgery Nonspecified)  Goal: Effective Bowel Elimination  Outcome: Ongoing, Progressing   Goal Outcome Evaluation:  Plan of Care Reviewed With: patient        Progress: improving

## 2024-03-22 NOTE — PLAN OF CARE
Goal Outcome Evaluation: Pt been up in chair and amb to bathroom sba with walker. Pain managed per MAR. Ice pack in place. Tolerated diet. Plan of care ongoing

## 2024-03-22 NOTE — PLAN OF CARE
Goal Outcome Evaluation:             Amita Arguello presents with functional mobility impairments which indicate the need for skilled intervention. Tolerating session today without incident. Will continue to follow and progress as tolerated. Pt has limited AROM to R knee but is progressing with gait and transfers.  She would benefit from  PT at discharge.

## 2024-03-22 NOTE — PROGRESS NOTES
Patient doing very well after surgery.  Dressing clean dry and intact.  Discharged home later today

## 2024-03-22 NOTE — CASE MANAGEMENT/SOCIAL WORK
Case Management Discharge Note      Final Note: HOME         Selected Continued Care - Discharged on 3/22/2024 Admission date: 3/21/2024 - Discharge disposition: Home or Self Care            Transportation Services  Private: Car    Final Discharge Disposition Code: 01 - home or self-care

## 2024-03-22 NOTE — PLAN OF CARE
Goal Outcome Evaluation: Pt pain been managed per MAR. uP AMB SEVERAL

## 2025-01-28 ENCOUNTER — LAB (OUTPATIENT)
Dept: LAB | Facility: HOSPITAL | Age: 65
End: 2025-01-28
Payer: COMMERCIAL

## 2025-01-28 ENCOUNTER — HOSPITAL ENCOUNTER (OUTPATIENT)
Dept: CARDIOLOGY | Facility: HOSPITAL | Age: 65
Discharge: HOME | End: 2025-01-28
Payer: COMMERCIAL

## 2025-01-28 ENCOUNTER — TRANSCRIBE ORDERS (OUTPATIENT)
Dept: ADMINISTRATIVE | Facility: HOSPITAL | Age: 65
End: 2025-01-28
Payer: COMMERCIAL

## 2025-01-28 DIAGNOSIS — Z01.818 PRE-OP TESTING: ICD-10-CM

## 2025-01-28 DIAGNOSIS — Z01.818 PRE-OP TESTING: Primary | ICD-10-CM

## 2025-01-28 DIAGNOSIS — N81.9 FEMALE GENITAL PROLAPSE, UNSPECIFIED TYPE: ICD-10-CM

## 2025-01-28 LAB
ANION GAP SERPL CALCULATED.3IONS-SCNC: 12 MMOL/L (ref 5–15)
BASOPHILS # BLD AUTO: 0.08 10*3/MM3 (ref 0–0.2)
BASOPHILS NFR BLD AUTO: 0.7 % (ref 0–1.5)
BUN SERPL-MCNC: 20 MG/DL (ref 8–23)
BUN/CREAT SERPL: 23.3 (ref 7–25)
CALCIUM SPEC-SCNC: 9.7 MG/DL (ref 8.6–10.5)
CHLORIDE SERPL-SCNC: 102 MMOL/L (ref 98–107)
CO2 SERPL-SCNC: 26 MMOL/L (ref 22–29)
CREAT SERPL-MCNC: 0.86 MG/DL (ref 0.57–1)
DEPRECATED RDW RBC AUTO: 45.6 FL (ref 37–54)
EGFRCR SERPLBLD CKD-EPI 2021: 76 ML/MIN/1.73
EOSINOPHIL # BLD AUTO: 0.07 10*3/MM3 (ref 0–0.4)
EOSINOPHIL NFR BLD AUTO: 0.6 % (ref 0.3–6.2)
ERYTHROCYTE [DISTWIDTH] IN BLOOD BY AUTOMATED COUNT: 14.1 % (ref 12.3–15.4)
GLUCOSE SERPL-MCNC: 91 MG/DL (ref 65–99)
HCT VFR BLD AUTO: 39.9 % (ref 34–46.6)
HGB BLD-MCNC: 13.7 G/DL (ref 12–15.9)
IMM GRANULOCYTES # BLD AUTO: 0.06 10*3/MM3 (ref 0–0.05)
IMM GRANULOCYTES NFR BLD AUTO: 0.5 % (ref 0–0.5)
LYMPHOCYTES # BLD AUTO: 1.08 10*3/MM3 (ref 0.7–3.1)
LYMPHOCYTES NFR BLD AUTO: 9 % (ref 19.6–45.3)
MCH RBC QN AUTO: 31.1 PG (ref 26.6–33)
MCHC RBC AUTO-ENTMCNC: 34.3 G/DL (ref 31.5–35.7)
MCV RBC AUTO: 90.5 FL (ref 79–97)
MONOCYTES # BLD AUTO: 0.42 10*3/MM3 (ref 0.1–0.9)
MONOCYTES NFR BLD AUTO: 3.5 % (ref 5–12)
NEUTROPHILS NFR BLD AUTO: 10.24 10*3/MM3 (ref 1.7–7)
NEUTROPHILS NFR BLD AUTO: 85.7 % (ref 42.7–76)
NRBC BLD AUTO-RTO: 0 /100 WBC (ref 0–0.2)
PLATELET # BLD AUTO: 490 10*3/MM3 (ref 140–450)
PMV BLD AUTO: 10.1 FL (ref 6–12)
POTASSIUM SERPL-SCNC: 4.3 MMOL/L (ref 3.5–5.2)
RBC # BLD AUTO: 4.41 10*6/MM3 (ref 3.77–5.28)
SODIUM SERPL-SCNC: 140 MMOL/L (ref 136–145)
WBC NRBC COR # BLD AUTO: 11.95 10*3/MM3 (ref 3.4–10.8)

## 2025-01-28 PROCEDURE — 36415 COLL VENOUS BLD VENIPUNCTURE: CPT

## 2025-01-28 PROCEDURE — 85025 COMPLETE CBC W/AUTO DIFF WBC: CPT

## 2025-01-28 PROCEDURE — 93005 ELECTROCARDIOGRAM TRACING: CPT

## 2025-01-28 PROCEDURE — 80048 BASIC METABOLIC PNL TOTAL CA: CPT

## 2025-02-02 LAB
QT INTERVAL: 459 MS
QTC INTERVAL: 490 MS

## 2025-03-24 ENCOUNTER — LAB (OUTPATIENT)
Dept: LAB | Facility: HOSPITAL | Age: 65
End: 2025-03-24
Payer: COMMERCIAL

## 2025-03-24 ENCOUNTER — TRANSCRIBE ORDERS (OUTPATIENT)
Dept: ADMINISTRATIVE | Facility: HOSPITAL | Age: 65
End: 2025-03-24
Payer: COMMERCIAL

## 2025-03-24 DIAGNOSIS — Z01.818 PRE-OP EXAM: ICD-10-CM

## 2025-03-24 DIAGNOSIS — N81.9 LOSS OF PELVIC SUPPORT: ICD-10-CM

## 2025-03-24 DIAGNOSIS — N81.9 LOSS OF PELVIC SUPPORT: Primary | ICD-10-CM

## 2025-03-24 LAB
ABO GROUP BLD: NORMAL
ANION GAP SERPL CALCULATED.3IONS-SCNC: 11 MMOL/L (ref 5–15)
BASOPHILS # BLD AUTO: 0.09 10*3/MM3 (ref 0–0.2)
BASOPHILS NFR BLD AUTO: 0.8 % (ref 0–1.5)
BLD GP AB SCN SERPL QL: NEGATIVE
BUN SERPL-MCNC: 13 MG/DL (ref 8–23)
BUN/CREAT SERPL: 15.3 (ref 7–25)
CALCIUM SPEC-SCNC: 9.8 MG/DL (ref 8.6–10.5)
CHLORIDE SERPL-SCNC: 103 MMOL/L (ref 98–107)
CO2 SERPL-SCNC: 25 MMOL/L (ref 22–29)
CREAT SERPL-MCNC: 0.85 MG/DL (ref 0.57–1)
DEPRECATED RDW RBC AUTO: 47.1 FL (ref 37–54)
EGFRCR SERPLBLD CKD-EPI 2021: 77.1 ML/MIN/1.73
EOSINOPHIL # BLD AUTO: 0.36 10*3/MM3 (ref 0–0.4)
EOSINOPHIL NFR BLD AUTO: 3.4 % (ref 0.3–6.2)
ERYTHROCYTE [DISTWIDTH] IN BLOOD BY AUTOMATED COUNT: 14.5 % (ref 12.3–15.4)
GLUCOSE SERPL-MCNC: 80 MG/DL (ref 65–99)
HCT VFR BLD AUTO: 40.1 % (ref 34–46.6)
HGB BLD-MCNC: 13.2 G/DL (ref 12–15.9)
IMM GRANULOCYTES # BLD AUTO: 0.05 10*3/MM3 (ref 0–0.05)
IMM GRANULOCYTES NFR BLD AUTO: 0.5 % (ref 0–0.5)
LYMPHOCYTES # BLD AUTO: 1.97 10*3/MM3 (ref 0.7–3.1)
LYMPHOCYTES NFR BLD AUTO: 18.5 % (ref 19.6–45.3)
MCH RBC QN AUTO: 29.6 PG (ref 26.6–33)
MCHC RBC AUTO-ENTMCNC: 32.9 G/DL (ref 31.5–35.7)
MCV RBC AUTO: 89.9 FL (ref 79–97)
MONOCYTES # BLD AUTO: 0.75 10*3/MM3 (ref 0.1–0.9)
MONOCYTES NFR BLD AUTO: 7 % (ref 5–12)
NEUTROPHILS NFR BLD AUTO: 69.8 % (ref 42.7–76)
NEUTROPHILS NFR BLD AUTO: 7.43 10*3/MM3 (ref 1.7–7)
NRBC BLD AUTO-RTO: 0 /100 WBC (ref 0–0.2)
PLATELET # BLD AUTO: 398 10*3/MM3 (ref 140–450)
PMV BLD AUTO: 10 FL (ref 6–12)
POTASSIUM SERPL-SCNC: 3.5 MMOL/L (ref 3.5–5.2)
RBC # BLD AUTO: 4.46 10*6/MM3 (ref 3.77–5.28)
RH BLD: POSITIVE
SODIUM SERPL-SCNC: 139 MMOL/L (ref 136–145)
T&S EXPIRATION DATE: NORMAL
WBC NRBC COR # BLD AUTO: 10.65 10*3/MM3 (ref 3.4–10.8)

## 2025-03-24 PROCEDURE — 86900 BLOOD TYPING SEROLOGIC ABO: CPT

## 2025-03-24 PROCEDURE — 86901 BLOOD TYPING SEROLOGIC RH(D): CPT

## 2025-03-24 PROCEDURE — 86850 RBC ANTIBODY SCREEN: CPT

## 2025-03-24 PROCEDURE — 85025 COMPLETE CBC W/AUTO DIFF WBC: CPT

## 2025-03-24 PROCEDURE — 80048 BASIC METABOLIC PNL TOTAL CA: CPT

## 2025-03-24 PROCEDURE — 36415 COLL VENOUS BLD VENIPUNCTURE: CPT

## (undated) DEVICE — CEMENT MIXING SYSTEM WITH FEMORAL BREAKWAY NOZZLE: Brand: REVOLUTION

## (undated) DEVICE — PK TOTL KN 50

## (undated) DEVICE — IRRIGATOR BULB ASEPTO 60CC STRL

## (undated) DEVICE — PICO 7 10CM X 30CM: Brand: PICO™ 7

## (undated) DEVICE — SYR LUERLOK 50ML

## (undated) DEVICE — SOL IRRIG NACL 1000ML

## (undated) DEVICE — WRAP KNEE COLD THERAPY

## (undated) DEVICE — GLV SURG SENSICARE PI ORTHO SZ8.5 LF STRL

## (undated) DEVICE — SOL ISO/ALC RUB 70PCT 4OZ

## (undated) DEVICE — SUT ETHIB 2 CV V37 MS/4 30IN MX69G

## (undated) DEVICE — NEEDLE, QUINCKE, 20GX3.5": Brand: MEDLINE

## (undated) DEVICE — UNDRGLV SURG BIOGEL PUNCTUREINDICATION SZ7 PF STRL

## (undated) DEVICE — HANDPIECE SET WITH COAXIAL MULTI-ORIFICE TIP AND SUCTION TUBE: Brand: INTERPULSE

## (undated) DEVICE — SYR LUERLOK 30CC

## (undated) DEVICE — SOL IRR NACL 0.9PCT 3000ML

## (undated) DEVICE — UNDERGLV SURG BIOGEL INDICAT PI SZ8.5 BLU

## (undated) DEVICE — ZIPPERED TOGA, 2X LARGE: Brand: FLYTE

## (undated) DEVICE — KT SURG TURNOVER 050

## (undated) DEVICE — CUFF TOURNI 1BLADDER 1PRT 30IN STRL

## (undated) DEVICE — DUAL CUT SAGITTAL BLADE

## (undated) DEVICE — SUT ETHLN 2/0 PS 18IN 585H

## (undated) DEVICE — 450 ML BOTTLE OF 0.05% CHLORHEXIDINE GLUCONATE IN 99.95% STERILE WATER FOR IRRIGATION, USP AND APPLICATOR.: Brand: IRRISEPT ANTIMICROBIAL WOUND LAVAGE

## (undated) DEVICE — CORI KNEE TENSIONER CARTRIDGE: Brand: CORI

## (undated) DEVICE — STERILE PATIENT PROTECTIVE PAD FOR IMP® KNEE POSITIONERS & COHESIVE WRAP (10 / CASE): Brand: DE MAYO KNEE POSITIONER®

## (undated) DEVICE — PENCL EVAC ULTRAVAC SMOKE W/BLD

## (undated) DEVICE — GLV SURG SENSICARE PI ORTHO PF SZ7 LF STRL

## (undated) DEVICE — APPL CHLORAPREP HI/LITE 26ML ORNG

## (undated) DEVICE — Device

## (undated) DEVICE — PENCL SMOKE/EVAC MEGADYNE TELESCP 15FT

## (undated) DEVICE — SUT VIC 0 CT1 36IN J946H

## (undated) DEVICE — DRAPE,U/ SHT,SPLIT,PLAS,STERIL: Brand: MEDLINE

## (undated) DEVICE — ZIP 24 SURGICAL SKIN CLOSURE DEVICE, PSA: Brand: ZIP 24 SURGICAL SKIN CLOSURE DEVICE

## (undated) DEVICE — COVER,MAYO STAND,STERILE: Brand: MEDLINE

## (undated) DEVICE — DISPOSABLE TOURNIQUET CUFF 30"X4", 1-LINE, WHITE, STERILE, 1EA/PK, 10PK/CS: Brand: ASP MEDICAL

## (undated) DEVICE — GOWN,REINFRCE,POLY,SIRUS,BREATH SLV,XXLG: Brand: MEDLINE